# Patient Record
Sex: MALE | Race: WHITE | NOT HISPANIC OR LATINO | URBAN - METROPOLITAN AREA
[De-identification: names, ages, dates, MRNs, and addresses within clinical notes are randomized per-mention and may not be internally consistent; named-entity substitution may affect disease eponyms.]

---

## 2017-02-10 ENCOUNTER — EMERGENCY (EMERGENCY)
Facility: HOSPITAL | Age: 27
LOS: 1 days | Discharge: HOME | End: 2017-02-10
Attending: EMERGENCY MEDICINE | Admitting: EMERGENCY MEDICINE
Payer: MEDICAID

## 2017-02-10 VITALS
HEART RATE: 73 BPM | RESPIRATION RATE: 18 BRPM | WEIGHT: 179.9 LBS | HEIGHT: 68 IN | DIASTOLIC BLOOD PRESSURE: 80 MMHG | TEMPERATURE: 98 F | SYSTOLIC BLOOD PRESSURE: 114 MMHG | OXYGEN SATURATION: 99 %

## 2017-02-10 DIAGNOSIS — B02.30 ZOSTER OCULAR DISEASE, UNSPECIFIED: ICD-10-CM

## 2017-02-10 DIAGNOSIS — H53.141 VISUAL DISCOMFORT, RIGHT EYE: ICD-10-CM

## 2017-02-10 DIAGNOSIS — F17.200 NICOTINE DEPENDENCE, UNSPECIFIED, UNCOMPLICATED: ICD-10-CM

## 2017-02-10 DIAGNOSIS — Z88.0 ALLERGY STATUS TO PENICILLIN: ICD-10-CM

## 2017-02-10 PROCEDURE — 99283 EMERGENCY DEPT VISIT LOW MDM: CPT

## 2017-02-10 RX ORDER — VALACYCLOVIR 500 MG/1
1000 TABLET, FILM COATED ORAL ONCE
Qty: 0 | Refills: 0 | Status: COMPLETED | OUTPATIENT
Start: 2017-02-10 | End: 2017-02-10

## 2017-02-10 RX ADMIN — VALACYCLOVIR 1000 MILLIGRAM(S): 500 TABLET, FILM COATED ORAL at 23:25

## 2017-02-10 NOTE — ED PROVIDER NOTE - MEDICAL DECISION MAKING DETAILS
pt. with h/o HIV un known cd 4 count, non - compliant with meds, h/o zoster ophthalmicus rt eye , present in g with same complaints, no dendritic lesion on eye, + abrasion @ 6oclock, , no other rashes noted. will tx with valacyclovir, eye f.u in 24 hrs.

## 2017-02-10 NOTE — ED PROVIDER NOTE - EYES, MLM
rt ey e tearing, erythematous conjunctivae, Clear bilaterally, pupils equal, round and reactive to light. lt eye WNL>

## 2017-02-10 NOTE — ED PROVIDER NOTE - OBJECTIVE STATEMENT
pt, with h/o HIV cd count unknown non-compliant with meds, h/o Opthalmic zoster to rt eye 3 yrs ago. now presenting with same eye pain X 2 days. + tearing, pain 7/10, no vision changes, no fever/chills, no Ha, no other rash.

## 2017-02-11 VITALS
HEART RATE: 75 BPM | RESPIRATION RATE: 20 BRPM | DIASTOLIC BLOOD PRESSURE: 85 MMHG | OXYGEN SATURATION: 100 % | SYSTOLIC BLOOD PRESSURE: 110 MMHG

## 2017-02-11 RX ORDER — VALACYCLOVIR 500 MG/1
1 TABLET, FILM COATED ORAL
Qty: 20 | Refills: 0
Start: 2017-02-11 | End: 2017-02-21

## 2017-04-11 ENCOUNTER — EMERGENCY (EMERGENCY)
Facility: HOSPITAL | Age: 27
LOS: 1 days | Discharge: HOME | End: 2017-04-11
Admitting: EMERGENCY MEDICINE
Payer: MEDICAID

## 2017-04-11 VITALS
OXYGEN SATURATION: 100 % | RESPIRATION RATE: 20 BRPM | SYSTOLIC BLOOD PRESSURE: 133 MMHG | HEART RATE: 88 BPM | DIASTOLIC BLOOD PRESSURE: 86 MMHG | TEMPERATURE: 98 F

## 2017-04-11 DIAGNOSIS — L05.01 PILONIDAL CYST WITH ABSCESS: ICD-10-CM

## 2017-04-11 DIAGNOSIS — Z88.0 ALLERGY STATUS TO PENICILLIN: ICD-10-CM

## 2017-04-11 DIAGNOSIS — Z79.891 LONG TERM (CURRENT) USE OF OPIATE ANALGESIC: ICD-10-CM

## 2017-04-11 DIAGNOSIS — Z79.899 OTHER LONG TERM (CURRENT) DRUG THERAPY: ICD-10-CM

## 2017-04-11 PROCEDURE — 99283 EMERGENCY DEPT VISIT LOW MDM: CPT

## 2017-04-11 RX ORDER — AZTREONAM 2 G
2 VIAL (EA) INJECTION
Qty: 40 | Refills: 0
Start: 2017-04-11 | End: 2017-04-21

## 2017-04-11 RX ORDER — OXYCODONE HYDROCHLORIDE 5 MG/1
1 TABLET ORAL
Qty: 8 | Refills: 0
Start: 2017-04-11 | End: 2017-04-13

## 2017-04-11 NOTE — ED PROVIDER NOTE - SKIN, MLM
1.1cm pilonidal abscess with surrounding edema and erythema, purulent drainage. No induration, no fluctuance.

## 2017-04-11 NOTE — ED PROVIDER NOTE - OBJECTIVE STATEMENT
26y M Pt with PMHx of HIV, non-compliant with medications and does not know counts, no allergies, presents to ED with pain over the buttock for the last 4-5 days. Pt follows up with [facility], was seen there recently and prescribed Bactrim, which Pt has been taking one tablet of twice daily for the last 5 days. Pt presents with drainage from pilonidal abscess starting 2 days ago, and worsening pain. Denies fever, chills, and sweats.

## 2017-04-11 NOTE — ED PROVIDER NOTE - PROGRESS NOTE DETAILS
The scribe's documentation has been prepared under my direction and personally reviewed by me in its entirety. I confirm that the note above accurately reflects all work, treatment, procedures, and medical decision making performed by me.  MARIE Freedman

## 2017-04-11 NOTE — ED PROVIDER NOTE - MEDICAL DECISION MAKING DETAILS
Pt with drainage from pilonidal abscess. Sent Rx for more Bactrim and instructed Pt to take two tablets twice daily for the next 10 days. Also sent Rx for percocet.

## 2017-04-11 NOTE — ED PROVIDER NOTE - NS ED MD SCRIBE ATTENDING SCRIBE SECTIONS
HISTORY OF PRESENT ILLNESS/VITAL SIGNS( Pullset)/INTAKE ASSESSMENT/SCREENINGS/PHYSICAL EXAM/REVIEW OF SYSTEMS/HIV/PAST MEDICAL/SURGICAL/SOCIAL HISTORY

## 2018-03-19 ENCOUNTER — OUTPATIENT (OUTPATIENT)
Dept: OUTPATIENT SERVICES | Facility: HOSPITAL | Age: 28
LOS: 1 days | Discharge: HOME | End: 2018-03-19

## 2018-08-31 ENCOUNTER — EMERGENCY (EMERGENCY)
Facility: HOSPITAL | Age: 28
LOS: 1 days | Discharge: HOME | End: 2018-08-31
Attending: EMERGENCY MEDICINE | Admitting: EMERGENCY MEDICINE
Payer: MEDICAID

## 2018-08-31 VITALS
OXYGEN SATURATION: 100 % | RESPIRATION RATE: 20 BRPM | TEMPERATURE: 98 F | SYSTOLIC BLOOD PRESSURE: 163 MMHG | DIASTOLIC BLOOD PRESSURE: 98 MMHG | HEART RATE: 89 BPM

## 2018-08-31 VITALS
HEART RATE: 84 BPM | RESPIRATION RATE: 18 BRPM | SYSTOLIC BLOOD PRESSURE: 129 MMHG | DIASTOLIC BLOOD PRESSURE: 79 MMHG | OXYGEN SATURATION: 99 %

## 2018-08-31 DIAGNOSIS — Z79.891 LONG TERM (CURRENT) USE OF OPIATE ANALGESIC: ICD-10-CM

## 2018-08-31 DIAGNOSIS — R41.82 ALTERED MENTAL STATUS, UNSPECIFIED: ICD-10-CM

## 2018-08-31 DIAGNOSIS — B20 HUMAN IMMUNODEFICIENCY VIRUS [HIV] DISEASE: ICD-10-CM
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
## 2018-08-31 DIAGNOSIS — Z88.0 ALLERGY STATUS TO PENICILLIN: ICD-10-CM

## 2018-08-31 DIAGNOSIS — R10.9 UNSPECIFIED ABDOMINAL PAIN: ICD-10-CM

## 2018-08-31 DIAGNOSIS — F15.10 OTHER STIMULANT ABUSE, UNCOMPLICATED: ICD-10-CM

## 2018-08-31 DIAGNOSIS — Z79.899 OTHER LONG TERM (CURRENT) DRUG THERAPY: ICD-10-CM

## 2018-08-31 PROBLEM — B02.9 ZOSTER WITHOUT COMPLICATIONS: Chronic | Status: ACTIVE | Noted: 2017-02-10

## 2018-08-31 LAB
ALBUMIN SERPL ELPH-MCNC: 4.2 G/DL — SIGNIFICANT CHANGE UP (ref 3.4–5)
ALP SERPL-CCNC: 103 U/L — SIGNIFICANT CHANGE UP (ref 40–120)
ALT FLD-CCNC: 24 U/L — SIGNIFICANT CHANGE UP (ref 12–42)
ANION GAP SERPL CALC-SCNC: 10 MMOL/L — SIGNIFICANT CHANGE UP (ref 9–16)
APPEARANCE UR: CLEAR — SIGNIFICANT CHANGE UP
AST SERPL-CCNC: 18 U/L — SIGNIFICANT CHANGE UP (ref 15–37)
BASOPHILS NFR BLD AUTO: 0.3 % — SIGNIFICANT CHANGE UP (ref 0–2)
BILIRUB SERPL-MCNC: 0.4 MG/DL — SIGNIFICANT CHANGE UP (ref 0.2–1.2)
BILIRUB UR-MCNC: NEGATIVE — SIGNIFICANT CHANGE UP
BUN SERPL-MCNC: 12 MG/DL — SIGNIFICANT CHANGE UP (ref 7–23)
CALCIUM SERPL-MCNC: 9.2 MG/DL — SIGNIFICANT CHANGE UP (ref 8.5–10.5)
CHLORIDE SERPL-SCNC: 97 MMOL/L — SIGNIFICANT CHANGE UP (ref 96–108)
CK MB BLD-MCNC: 0.91 % — SIGNIFICANT CHANGE UP
CK MB CFR SERPL CALC: 1.3 NG/ML — SIGNIFICANT CHANGE UP (ref 0.5–3.6)
CK SERPL-CCNC: 143 U/L — SIGNIFICANT CHANGE UP (ref 39–308)
CO2 SERPL-SCNC: 27 MMOL/L — SIGNIFICANT CHANGE UP (ref 22–31)
COLOR SPEC: YELLOW — SIGNIFICANT CHANGE UP
CREAT SERPL-MCNC: 0.97 MG/DL — SIGNIFICANT CHANGE UP (ref 0.5–1.3)
DIFF PNL FLD: NEGATIVE — SIGNIFICANT CHANGE UP
EOSINOPHIL NFR BLD AUTO: 0.3 % — SIGNIFICANT CHANGE UP (ref 0–6)
ETHANOL SERPL-MCNC: <3 MG/DL — SIGNIFICANT CHANGE UP
GLUCOSE SERPL-MCNC: 145 MG/DL — HIGH (ref 70–99)
GLUCOSE UR QL: NEGATIVE — SIGNIFICANT CHANGE UP
HCT VFR BLD CALC: 42.3 % — SIGNIFICANT CHANGE UP (ref 39–50)
HGB BLD-MCNC: 15.2 G/DL — SIGNIFICANT CHANGE UP (ref 13–17)
IMM GRANULOCYTES NFR BLD AUTO: 0.4 % — SIGNIFICANT CHANGE UP (ref 0–1.5)
KETONES UR-MCNC: ABNORMAL MG/DL
LEUKOCYTE ESTERASE UR-ACNC: NEGATIVE — SIGNIFICANT CHANGE UP
LIDOCAIN IGE QN: 112 U/L — SIGNIFICANT CHANGE UP (ref 73–393)
LYMPHOCYTES # BLD AUTO: 47.6 % — HIGH (ref 13–44)
MCHC RBC-ENTMCNC: 30.6 PG — SIGNIFICANT CHANGE UP (ref 27–34)
MCHC RBC-ENTMCNC: 35.9 G/DL — SIGNIFICANT CHANGE UP (ref 32–36)
MCV RBC AUTO: 85.3 FL — SIGNIFICANT CHANGE UP (ref 80–100)
MONOCYTES NFR BLD AUTO: 6.8 % — SIGNIFICANT CHANGE UP (ref 2–14)
NEUTROPHILS NFR BLD AUTO: 44.6 % — SIGNIFICANT CHANGE UP (ref 43–77)
NITRITE UR-MCNC: NEGATIVE — SIGNIFICANT CHANGE UP
PCP SPEC-MCNC: SIGNIFICANT CHANGE UP
PH UR: 6 — SIGNIFICANT CHANGE UP (ref 5–8)
PLATELET # BLD AUTO: 333 K/UL — SIGNIFICANT CHANGE UP (ref 150–400)
POTASSIUM SERPL-MCNC: 3.2 MMOL/L — LOW (ref 3.5–5.3)
POTASSIUM SERPL-SCNC: 3.2 MMOL/L — LOW (ref 3.5–5.3)
PROT SERPL-MCNC: 9.4 G/DL — HIGH (ref 6.4–8.2)
PROT UR-MCNC: NEGATIVE MG/DL — SIGNIFICANT CHANGE UP
RBC # BLD: 4.96 M/UL — SIGNIFICANT CHANGE UP (ref 4.2–5.8)
RBC # FLD: 12.5 % — SIGNIFICANT CHANGE UP (ref 10.3–16.9)
SODIUM SERPL-SCNC: 134 MMOL/L — SIGNIFICANT CHANGE UP (ref 132–145)
SP GR SPEC: 1.02 — SIGNIFICANT CHANGE UP (ref 1–1.03)
TROPONIN I SERPL-MCNC: <0.017 NG/ML — LOW (ref 0.02–0.06)
UROBILINOGEN FLD QL: 0.2 E.U./DL — SIGNIFICANT CHANGE UP
WBC # BLD: 14.8 K/UL — HIGH (ref 3.8–10.5)
WBC # FLD AUTO: 14.8 K/UL — HIGH (ref 3.8–10.5)

## 2018-08-31 PROCEDURE — 74176 CT ABD & PELVIS W/O CONTRAST: CPT | Mod: 26

## 2018-08-31 PROCEDURE — 99284 EMERGENCY DEPT VISIT MOD MDM: CPT | Mod: 25

## 2018-08-31 RX ORDER — SODIUM CHLORIDE 9 MG/ML
1000 INJECTION, SOLUTION INTRAVENOUS
Qty: 0 | Refills: 0 | Status: DISCONTINUED | OUTPATIENT
Start: 2018-08-31 | End: 2018-09-04

## 2018-08-31 RX ORDER — SODIUM CHLORIDE 9 MG/ML
1000 INJECTION INTRAMUSCULAR; INTRAVENOUS; SUBCUTANEOUS ONCE
Qty: 0 | Refills: 0 | Status: COMPLETED | OUTPATIENT
Start: 2018-08-31 | End: 2018-08-31

## 2018-08-31 RX ADMIN — Medication 0.5 MILLIGRAM(S): at 09:18

## 2018-08-31 RX ADMIN — SODIUM CHLORIDE 1000 MILLILITER(S): 9 INJECTION INTRAMUSCULAR; INTRAVENOUS; SUBCUTANEOUS at 07:30

## 2018-08-31 RX ADMIN — SODIUM CHLORIDE 2000 MILLILITER(S): 9 INJECTION INTRAMUSCULAR; INTRAVENOUS; SUBCUTANEOUS at 07:01

## 2018-08-31 RX ADMIN — SODIUM CHLORIDE 1000 MILLILITER(S): 9 INJECTION, SOLUTION INTRAVENOUS at 09:18

## 2018-08-31 NOTE — ED PROVIDER NOTE - MEDICAL DECISION MAKING DETAILS
Will check labs and U tox.  Will check CTAP to r/o acute pathology given severity of pain. Will sign out to day team at 8am.

## 2018-08-31 NOTE — ED PROVIDER NOTE - CARE PLAN
Principal Discharge DX:	Altered mental status  Secondary Diagnosis:	HIV (human immunodeficiency virus infection)  Secondary Diagnosis:	Amphetamine abuse

## 2018-08-31 NOTE — ED PROVIDER NOTE - PHYSICAL EXAMINATION
VITAL SIGNS: I have reviewed nursing notes and confirm.  CONSTITUTIONAL: Writhing around in stretcher and hyperventilating.  Calms down with verbal techniques and cooperates with w/u but still unwilling to give full hx.   SKIN: Skin is warm and dry, no acute rash.  HEAD: Normocephalic; atraumatic.  EYES: PERRL, EOM intact; conjunctiva and sclera clear.  ENT: No nasal discharge; airway clear.  NECK: Supple; non tender.  CARD: S1, S2 normal; no murmurs, gallops, or rubs. Regular rate and rhythm.  RESP: No wheezes, rales or rhonchi.  ABD: Normal bowel sounds; soft; non-distended; non-tender; no hepatosplenomegaly.  EXT: Normal ROM. No clubbing, cyanosis or edema.  NEURO: Alert, oriented. Grossly unremarkable.  PSYCH: Cooperative, appropriate.

## 2018-08-31 NOTE — ED ADULT NURSE REASSESSMENT NOTE - NS ED NURSE REASSESS COMMENT FT1
pt is now agitated and hyperventilating. pt given NRB without O2 to help with breathing and instructed on how to breathe.
RECEIVED PT FROM NIGHT SHIFT RN. PT IS IN NAD, RESTING COMFORTABLY, AND WILL CONTINUE TO MONITOR.

## 2018-08-31 NOTE — ED ADULT NURSE NOTE - NSIMPLEMENTINTERV_GEN_ALL_ED
Implemented All Universal Safety Interventions:  [place] to call system. Call bell, personal items and telephone within reach. Instruct patient to call for assistance. Room bathroom lighting operational. Non-slip footwear when patient is off stretcher. Physically safe environment: no spills, clutter or unnecessary equipment. Stretcher in lowest position, wheels locked, appropriate side rails in place.
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
## 2018-08-31 NOTE — ED PROVIDER NOTE - OBJECTIVE STATEMENT
Pt is a 29yo M who reports sudden onset of severe abd pain.  Reports sharp and stabbing.  Pt is extremely anxious and unwilling to give past hx or setting under which sxs occurred.  Security noted pt was lying on floor outside of ED and then came in to be triaged.    PMH - HIV - unknown last CD4/VL.

## 2018-08-31 NOTE — ED ADULT TRIAGE NOTE - CHIEF COMPLAINT QUOTE
pt ambulatory, here for sudden onset abdominal and back pain. Pt refused to give further info. Grimacing at arrival.

## 2018-09-01 ENCOUNTER — EMERGENCY (EMERGENCY)
Facility: HOSPITAL | Age: 28
LOS: 1 days | Discharge: HOME | End: 2018-09-01
Admitting: EMERGENCY MEDICINE
Payer: MEDICAID

## 2018-09-01 VITALS
OXYGEN SATURATION: 99 % | TEMPERATURE: 99 F | SYSTOLIC BLOOD PRESSURE: 140 MMHG | DIASTOLIC BLOOD PRESSURE: 92 MMHG | HEART RATE: 128 BPM | RESPIRATION RATE: 18 BRPM

## 2018-09-01 DIAGNOSIS — Z79.2 LONG TERM (CURRENT) USE OF ANTIBIOTICS: ICD-10-CM

## 2018-09-01 DIAGNOSIS — R21 RASH AND OTHER NONSPECIFIC SKIN ERUPTION: ICD-10-CM

## 2018-09-01 DIAGNOSIS — R53.1 WEAKNESS: ICD-10-CM

## 2018-09-01 DIAGNOSIS — Z79.891 LONG TERM (CURRENT) USE OF OPIATE ANALGESIC: ICD-10-CM

## 2018-09-01 DIAGNOSIS — R51 HEADACHE: ICD-10-CM

## 2018-09-01 DIAGNOSIS — B20 HUMAN IMMUNODEFICIENCY VIRUS [HIV] DISEASE: ICD-10-CM

## 2018-09-01 DIAGNOSIS — Z79.899 OTHER LONG TERM (CURRENT) DRUG THERAPY: ICD-10-CM

## 2018-09-01 DIAGNOSIS — R10.9 UNSPECIFIED ABDOMINAL PAIN: ICD-10-CM

## 2018-09-01 DIAGNOSIS — F17.200 NICOTINE DEPENDENCE, UNSPECIFIED, UNCOMPLICATED: ICD-10-CM

## 2018-09-01 DIAGNOSIS — Z88.0 ALLERGY STATUS TO PENICILLIN: ICD-10-CM

## 2018-09-01 DIAGNOSIS — R50.9 FEVER, UNSPECIFIED: ICD-10-CM

## 2018-09-01 PROCEDURE — 93010 ELECTROCARDIOGRAM REPORT: CPT | Mod: XU

## 2018-09-01 PROCEDURE — 62270 DX LMBR SPI PNXR: CPT

## 2018-09-01 PROCEDURE — 99285 EMERGENCY DEPT VISIT HI MDM: CPT | Mod: 25

## 2018-09-01 RX ORDER — MIDAZOLAM HYDROCHLORIDE 1 MG/ML
1 INJECTION, SOLUTION INTRAMUSCULAR; INTRAVENOUS ONCE
Qty: 0 | Refills: 0 | Status: DISCONTINUED | OUTPATIENT
Start: 2018-09-01 | End: 2018-09-01

## 2018-09-01 RX ORDER — AZITHROMYCIN 500 MG/1
1000 TABLET, FILM COATED ORAL ONCE
Qty: 0 | Refills: 0 | Status: COMPLETED | OUTPATIENT
Start: 2018-09-01 | End: 2018-09-01

## 2018-09-01 RX ORDER — SODIUM CHLORIDE 9 MG/ML
1000 INJECTION INTRAMUSCULAR; INTRAVENOUS; SUBCUTANEOUS ONCE
Qty: 0 | Refills: 0 | Status: COMPLETED | OUTPATIENT
Start: 2018-09-01 | End: 2018-09-01

## 2018-09-01 RX ORDER — CEFTRIAXONE 500 MG/1
0.25 INJECTION, POWDER, FOR SOLUTION INTRAMUSCULAR; INTRAVENOUS ONCE
Qty: 0 | Refills: 0 | Status: COMPLETED | OUTPATIENT
Start: 2018-09-01 | End: 2018-09-01

## 2018-09-01 NOTE — ED PROVIDER NOTE - PMH
HIV (human immunodeficiency virus infection)    Shingles HIV (human immunodeficiency virus infection)    Shingles    Syphilis

## 2018-09-01 NOTE — ED PROVIDER NOTE - PHYSICAL EXAMINATION
Vital Signs - nursing notes reviewed and confirmed  Gen - WDWN, NAD, comfortable and non-toxic appearing, speaking in full sentences   Skin - warm, dry,   HEENT - AT/NC, PERRL, EOMI, no conjunctival injection, moist oral mucosa, o/p clear with no erythema, edema, or exudate, uvula midline, airway patent, neck supple and NT, FROM, no JVD or carotid bruits b/l, no palpable nodes  CV - S1S2, R/R/R  Resp - respiration non-labored, CTAB, symmetric bs b/l, no r/r/w  GI - NABS, soft, ND, NT, no rebound or guarding, no CVAT b/l   MS - w/w/p, no c/c/e, calves supple and NT, distal pulses symmetric b/l, brisk cap refills   Neuro - AxOx3, no focal neuro deficits, CN II-XII grossly intact, cerebellar function intact, negative pronator drift, negative nystagmus, ambulatory without gait disturbance Vital Signs - nursing notes reviewed and confirmed  Gen - WDWN M, NAD, comfortable and non-toxic appearing, speaking in full sentences   Skin - warm, dry,   HEENT - AT/NC, PERRL, EOMI, no conjunctival injection, moist oral mucosa, o/p clear with no erythema, edema, or exudate, uvula midline, airway patent, neck supple and NT, FROM, no JVD or carotid bruits b/l, no palpable nodes  CV - S1S2, R/R/R  Resp - respiration non-labored, CTAB, symmetric bs b/l, no r/r/w  GI - NABS, soft, ND, NT, no rebound or guarding, no CVAT b/l   MS - w/w/p, no c/c/e, calves supple and NT, distal pulses symmetric b/l, brisk cap refills   Neuro - AxOx3, no focal neuro deficits, CN II-XII grossly intact, cerebellar function intact, negative pronator drift, negative nystagmus, ambulatory without gait disturbance Vital Signs - nursing notes reviewed and confirmed  Gen - WDWN M, NAD, comfortable and non-toxic appearing, speaking in full sentences   Skin - warm, dry, NT, balanachable maculopapular rash to the back, and b/l dorsum feet, not involving the palms/soles/oral mucosa, no desquamation of skin, no crepitus, warmth, ecchymosis, streaking or fluctuance noted   HEENT - AT/NC, PERRL, EOMI, no conjunctival injection, moist oral mucosa, o/p clear with no erythema, edema, or exudate, uvula midline, airway patent, neck supple and NT, FROM, no JVD or carotid bruits b/l, no palpable nodes  CV - S1S2, R/R/R  Resp - respiration non-labored, CTAB, symmetric bs b/l, no r/r/w  GI - NABS, soft, ND, NT, no rebound or guarding, no CVAT b/l   MS - w/w/p, no c/c/e, calves supple and NT, distal pulses symmetric b/l, brisk cap refills   Neuro - AxOx3, no focal neuro deficits, CN II-XII grossly intact, cerebellar function intact, negative pronator drift, negative nystagmus, ambulatory without gait disturbance

## 2018-09-01 NOTE — ED PROVIDER NOTE - MEDICAL DECISION MAKING DETAILS
AFVSS at time of d/c, pt non-toxic appearing, results, ddx, and f/u plans discussed with pt at bedside, d/c'd home to f/u with PMD, strict return precautions discussed, prompt return to ER for any worsening or new sx, pt verbalized understanding. pt with generalized malaise, subjective fever/chills and HA, neck pain x few days, seen here yesterday and s/p labs and CT A/P with unremarkable findings, h/o STI in the past, now with new lesions to the back and b/l feet but sparing palms/soles/oral mucosa, repeat labs unremarkable except for mild hypokalemia, s/p STD and ticke borne dz panel, empirically tx with abx for syphilis/GC/CG, LP performed, unremarkable, gram stain neg, VDRL/syphilis/lyme PCR obtained and sent, pt reports feeling better s/p IVF and supportive care, AFVSS and non-toxic appearing, results, ddx, and f/u plans discussed with pt at bedside, d/c'd home to f/u with PMD and ID, strict return precautions discussed, prompt return to ER for any worsening or new sx, pt verbalized understanding.

## 2018-09-01 NOTE — ED ADULT NURSE NOTE - NSIMPLEMENTINTERV_GEN_ALL_ED
Implemented All Universal Safety Interventions:  [place] to call system. Call bell, personal items and telephone within reach. Instruct patient to call for assistance. Room bathroom lighting operational. Non-slip footwear when patient is off stretcher. Physically safe environment: no spills, clutter or unnecessary equipment. Stretcher in lowest position, wheels locked, appropriate side rails in place.

## 2018-09-01 NOTE — ED ADULT TRIAGE NOTE - CHIEF COMPLAINT QUOTE
Pt states he has been "feeling sick" for the pas 5 days. Describing multiple vague symptoms and generalized weakness. Lethargic and tachycardic in triage. Denies drug use. Pt's friend states "pt has latent syphilis".  Was seen in ED 2 days ago for same complaint.  PMH HIV.

## 2018-09-01 NOTE — ED PROVIDER NOTE - OBJECTIVE STATEMENT
29 yo M with PMHx of HIV, last CD4/VL unknown, on HAART, syphilis in 2014 and treated, seen here 1d ago for similar sx, 29 yo M with PMHx of HIV, last CD4/VL unknown, on HAART, syphilis in 2014 and treated, and shingles, seen here 1d ago for similar sx, presenting today c/o worsening fatigue, generalized weakness, subjective fever, chills, HA, and rash. Denies SOB, CP, palpitations, wheezing, stridors, tongue/lip swelling, focal weakness, dizziness, N/V/D/C, oral/genital lesions, cough, paresthesia, numbness, tingling, malaise, and diaphoresis. 27 yo M with PMHx of HIV, last CD4/VL unknown, on HAART, syphilis in 2014 and treated, and shingles, seen here 1d ago for similar sx, presenting today c/o worsening fatigue, generalized weakness, subjective fever, chills, HA, and rash. Pt reports feeling unwell for the past 5d with generalized malaise, HA, neck and back pain.  Was having nausea and abdominal pain two days ago, seen here s/p labs and CT with slightly elevated wbc to 14 but no acute findings on CT.  Noted ?lesion/rash to the back and feet few days ago.  Denies SOB, CP, palpitations, wheezing, stridors, tongue/lip swelling, focal weakness, dizziness, V/D/C, oral/genital lesions, cough, paresthesia, numbness, tingling, malaise, and diaphoresis. Pt is sexually active with single male partner, last intercourse one wk ago with no protection noted.

## 2018-09-01 NOTE — ED ADULT NURSE NOTE - OBJECTIVE STATEMENT
pt aox4 presenting with generalized pain and weakness. most pain in neck and back. pt states he is having back spasms. pt otherwise neurologically wnl. no sob or difficulty breathing noted. lung sounds clear bilaterally. skin appropriate for ethnicity, warm and dry. rash noted to back. pulses 2+ and regular. cap refill < 2 secs. sinus tach noted to tele with hr 110. abd rounded soft and nontender.

## 2018-09-02 VITALS
HEART RATE: 87 BPM | RESPIRATION RATE: 18 BRPM | SYSTOLIC BLOOD PRESSURE: 119 MMHG | OXYGEN SATURATION: 97 % | TEMPERATURE: 97 F | DIASTOLIC BLOOD PRESSURE: 63 MMHG

## 2018-09-02 LAB
ALBUMIN SERPL ELPH-MCNC: 3.8 G/DL — SIGNIFICANT CHANGE UP (ref 3.4–5)
ALP SERPL-CCNC: 97 U/L — SIGNIFICANT CHANGE UP (ref 40–120)
ALT FLD-CCNC: 22 U/L — SIGNIFICANT CHANGE UP (ref 12–42)
ANION GAP SERPL CALC-SCNC: 7 MMOL/L — LOW (ref 9–16)
APPEARANCE CSF: CLEAR — SIGNIFICANT CHANGE UP
APPEARANCE SPUN FLD: COLORLESS — SIGNIFICANT CHANGE UP
AST SERPL-CCNC: 17 U/L — SIGNIFICANT CHANGE UP (ref 15–37)
BACTERIAL AG PNL SER: 0 % — SIGNIFICANT CHANGE UP
BASOPHILS NFR BLD AUTO: 0.6 % — SIGNIFICANT CHANGE UP (ref 0–2)
BILIRUB SERPL-MCNC: 0.2 MG/DL — SIGNIFICANT CHANGE UP (ref 0.2–1.2)
BUN SERPL-MCNC: 4 MG/DL — LOW (ref 7–23)
CALCIUM SERPL-MCNC: 9.1 MG/DL — SIGNIFICANT CHANGE UP (ref 8.5–10.5)
CHLORIDE SERPL-SCNC: 101 MMOL/L — SIGNIFICANT CHANGE UP (ref 96–108)
CO2 SERPL-SCNC: 29 MMOL/L — SIGNIFICANT CHANGE UP (ref 22–31)
COLOR CSF: COLORLESS — SIGNIFICANT CHANGE UP
CREAT SERPL-MCNC: 0.74 MG/DL — SIGNIFICANT CHANGE UP (ref 0.5–1.3)
CRYPTOC AG CSF-ACNC: NEGATIVE — SIGNIFICANT CHANGE UP
CSF COMMENTS: SIGNIFICANT CHANGE UP
CSF PCR RESULT: SIGNIFICANT CHANGE UP
EOSINOPHIL # CSF: 0 % — SIGNIFICANT CHANGE UP
EOSINOPHIL NFR BLD AUTO: 1.4 % — SIGNIFICANT CHANGE UP (ref 0–6)
ETHANOL SERPL-MCNC: <3 MG/DL — SIGNIFICANT CHANGE UP
GLUCOSE CSF-MCNC: 74 MG/DL — HIGH (ref 40–70)
GLUCOSE SERPL-MCNC: 117 MG/DL — HIGH (ref 70–99)
GRAM STN FLD: SIGNIFICANT CHANGE UP
HCT VFR BLD CALC: 44.5 % — SIGNIFICANT CHANGE UP (ref 39–50)
HGB BLD-MCNC: 15.3 G/DL — SIGNIFICANT CHANGE UP (ref 13–17)
IMM GRANULOCYTES NFR BLD AUTO: 1.4 % — SIGNIFICANT CHANGE UP (ref 0–1.5)
LACTATE SERPL-SCNC: 1.2 MMOL/L — SIGNIFICANT CHANGE UP (ref 0.4–2)
LYMPHOCYTES # BLD AUTO: 33.3 % — SIGNIFICANT CHANGE UP (ref 13–44)
LYMPHOCYTES # CSF: 0 % — LOW (ref 40–80)
MAGNESIUM SERPL-MCNC: 2.1 MG/DL — SIGNIFICANT CHANGE UP (ref 1.6–2.6)
MCHC RBC-ENTMCNC: 29.9 PG — SIGNIFICANT CHANGE UP (ref 27–34)
MCHC RBC-ENTMCNC: 34.4 G/DL — SIGNIFICANT CHANGE UP (ref 32–36)
MCV RBC AUTO: 87.1 FL — SIGNIFICANT CHANGE UP (ref 80–100)
MONOCYTES NFR BLD AUTO: 10.3 % — SIGNIFICANT CHANGE UP (ref 2–14)
MONOS+MACROS NFR CSF: 0 % — LOW (ref 15–45)
NEUTROPHILS # CSF: 0 % — SIGNIFICANT CHANGE UP (ref 0–6)
NEUTROPHILS NFR BLD AUTO: 53 % — SIGNIFICANT CHANGE UP (ref 43–77)
NRBC NFR CSF: 0 % — SIGNIFICANT CHANGE UP (ref 0–0)
NRBC NFR CSF: 0 /UL — SIGNIFICANT CHANGE UP
OTHER CELLS CSF MANUAL: 0 % — SIGNIFICANT CHANGE UP
PCP SPEC-MCNC: SIGNIFICANT CHANGE UP
PLATELET # BLD AUTO: 318 K/UL — SIGNIFICANT CHANGE UP (ref 150–400)
POTASSIUM SERPL-MCNC: 3.2 MMOL/L — LOW (ref 3.5–5.3)
POTASSIUM SERPL-SCNC: 3.2 MMOL/L — LOW (ref 3.5–5.3)
PROT CSF-MCNC: 36 MG/DL — SIGNIFICANT CHANGE UP (ref 15–45)
PROT SERPL-MCNC: 8.8 G/DL — HIGH (ref 6.4–8.2)
RBC # BLD: 5.11 M/UL — SIGNIFICANT CHANGE UP (ref 4.2–5.8)
RBC # CSF: 0 /UL — SIGNIFICANT CHANGE UP (ref 0–0)
RBC # FLD: 12.5 % — SIGNIFICANT CHANGE UP (ref 10.3–16.9)
SODIUM SERPL-SCNC: 137 MMOL/L — SIGNIFICANT CHANGE UP (ref 132–145)
SPECIMEN SOURCE: SIGNIFICANT CHANGE UP
TUBE TYPE: SIGNIFICANT CHANGE UP
WBC # BLD: 6.4 K/UL — SIGNIFICANT CHANGE UP (ref 3.8–10.5)
WBC # FLD AUTO: 6.4 K/UL — SIGNIFICANT CHANGE UP (ref 3.8–10.5)

## 2018-09-02 PROCEDURE — 70450 CT HEAD/BRAIN W/O DYE: CPT | Mod: 26

## 2018-09-02 RX ORDER — POTASSIUM CHLORIDE 20 MEQ
40 PACKET (EA) ORAL ONCE
Qty: 0 | Refills: 0 | Status: COMPLETED | OUTPATIENT
Start: 2018-09-02 | End: 2018-09-02

## 2018-09-02 RX ORDER — SODIUM CHLORIDE 9 MG/ML
1000 INJECTION INTRAMUSCULAR; INTRAVENOUS; SUBCUTANEOUS ONCE
Qty: 0 | Refills: 0 | Status: COMPLETED | OUTPATIENT
Start: 2018-09-02 | End: 2018-09-02

## 2018-09-02 RX ADMIN — MIDAZOLAM HYDROCHLORIDE 1 MILLIGRAM(S): 1 INJECTION, SOLUTION INTRAMUSCULAR; INTRAVENOUS at 01:01

## 2018-09-02 RX ADMIN — AZITHROMYCIN 1000 MILLIGRAM(S): 500 TABLET, FILM COATED ORAL at 00:32

## 2018-09-02 RX ADMIN — Medication 40 MILLIEQUIVALENT(S): at 03:47

## 2018-09-02 RX ADMIN — SODIUM CHLORIDE 2000 MILLILITER(S): 9 INJECTION INTRAMUSCULAR; INTRAVENOUS; SUBCUTANEOUS at 00:33

## 2018-09-02 RX ADMIN — SODIUM CHLORIDE 2000 MILLILITER(S): 9 INJECTION INTRAMUSCULAR; INTRAVENOUS; SUBCUTANEOUS at 01:25

## 2018-09-02 RX ADMIN — CEFTRIAXONE 100 GRAM(S): 500 INJECTION, POWDER, FOR SOLUTION INTRAMUSCULAR; INTRAVENOUS at 00:32

## 2018-09-02 RX ADMIN — Medication 100 MILLIGRAM(S): at 00:32

## 2018-09-02 NOTE — ED PROCEDURE NOTE - PROCEDURE ADDITIONAL DETAILS
pt placed in supine position post LP with IVF, no active bleeding or HA noted, neurologically intact post LP

## 2018-09-03 LAB
C TRACH RRNA SPEC QL NAA+PROBE: SIGNIFICANT CHANGE UP
LYME C6 AB IGG/IGM EIA REFLEX WESTERN BL: SIGNIFICANT CHANGE UP
N GONORRHOEA RRNA SPEC QL NAA+PROBE: SIGNIFICANT CHANGE UP
SPECIMEN SOURCE: SIGNIFICANT CHANGE UP

## 2018-09-04 ENCOUNTER — EMERGENCY (EMERGENCY)
Facility: HOSPITAL | Age: 28
LOS: 1 days | Discharge: HOME | End: 2018-09-04
Attending: EMERGENCY MEDICINE | Admitting: EMERGENCY MEDICINE
Payer: MEDICAID

## 2018-09-04 VITALS
DIASTOLIC BLOOD PRESSURE: 72 MMHG | RESPIRATION RATE: 17 BRPM | TEMPERATURE: 99 F | SYSTOLIC BLOOD PRESSURE: 170 MMHG | OXYGEN SATURATION: 100 % | HEART RATE: 104 BPM

## 2018-09-04 VITALS
TEMPERATURE: 98 F | DIASTOLIC BLOOD PRESSURE: 102 MMHG | RESPIRATION RATE: 18 BRPM | SYSTOLIC BLOOD PRESSURE: 155 MMHG | OXYGEN SATURATION: 99 % | HEART RATE: 120 BPM

## 2018-09-04 PROBLEM — A53.9 SYPHILIS, UNSPECIFIED: Chronic | Status: ACTIVE | Noted: 2018-09-02

## 2018-09-04 LAB
ANION GAP SERPL CALC-SCNC: 4 MMOL/L — LOW (ref 9–16)
B MICROTI DNA BLD QL NAA+PROBE: NEGATIVE — SIGNIFICANT CHANGE UP
BABESIA DNA SPEC QL NAA+PROBE: NEGATIVE — SIGNIFICANT CHANGE UP
BABESIA DNA SPEC QL NAA+PROBE: NEGATIVE — SIGNIFICANT CHANGE UP
BASOPHILS NFR BLD AUTO: 0.3 % — SIGNIFICANT CHANGE UP (ref 0–2)
BUN SERPL-MCNC: 5 MG/DL — LOW (ref 7–23)
CALCIUM SERPL-MCNC: 9.2 MG/DL — SIGNIFICANT CHANGE UP (ref 8.5–10.5)
CHLORIDE SERPL-SCNC: 101 MMOL/L — SIGNIFICANT CHANGE UP (ref 96–108)
CO2 SERPL-SCNC: 33 MMOL/L — HIGH (ref 22–31)
CREAT SERPL-MCNC: 0.82 MG/DL — SIGNIFICANT CHANGE UP (ref 0.5–1.3)
CRP SERPL-MCNC: 0.4 MG/DL — SIGNIFICANT CHANGE UP (ref 0–0.9)
EOSINOPHIL NFR BLD AUTO: 0.6 % — SIGNIFICANT CHANGE UP (ref 0–6)
GLUCOSE SERPL-MCNC: 111 MG/DL — HIGH (ref 70–99)
HCT VFR BLD CALC: 44.5 % — SIGNIFICANT CHANGE UP (ref 39–50)
HGB BLD-MCNC: 15.2 G/DL — SIGNIFICANT CHANGE UP (ref 13–17)
IMM GRANULOCYTES NFR BLD AUTO: 0.7 % — SIGNIFICANT CHANGE UP (ref 0–1.5)
LYMPHOCYTES # BLD AUTO: 21.1 % — SIGNIFICANT CHANGE UP (ref 13–44)
MCHC RBC-ENTMCNC: 30.3 PG — SIGNIFICANT CHANGE UP (ref 27–34)
MCHC RBC-ENTMCNC: 34.2 G/DL — SIGNIFICANT CHANGE UP (ref 32–36)
MCV RBC AUTO: 88.6 FL — SIGNIFICANT CHANGE UP (ref 80–100)
MONOCYTES NFR BLD AUTO: 7.2 % — SIGNIFICANT CHANGE UP (ref 2–14)
NEUTROPHILS NFR BLD AUTO: 70.1 % — SIGNIFICANT CHANGE UP (ref 43–77)
PLATELET # BLD AUTO: 291 K/UL — SIGNIFICANT CHANGE UP (ref 150–400)
POTASSIUM SERPL-MCNC: 3.9 MMOL/L — SIGNIFICANT CHANGE UP (ref 3.5–5.3)
POTASSIUM SERPL-SCNC: 3.9 MMOL/L — SIGNIFICANT CHANGE UP (ref 3.5–5.3)
RBC # BLD: 5.02 M/UL — SIGNIFICANT CHANGE UP (ref 4.2–5.8)
RBC # FLD: 12.5 % — SIGNIFICANT CHANGE UP (ref 10.3–16.9)
SODIUM SERPL-SCNC: 138 MMOL/L — SIGNIFICANT CHANGE UP (ref 132–145)
T PALLIDUM AB TITR SER: POSITIVE
WBC # BLD: 8.9 K/UL — SIGNIFICANT CHANGE UP (ref 3.8–10.5)
WBC # FLD AUTO: 8.9 K/UL — SIGNIFICANT CHANGE UP (ref 3.8–10.5)

## 2018-09-04 PROCEDURE — 99284 EMERGENCY DEPT VISIT MOD MDM: CPT | Mod: 25

## 2018-09-04 PROCEDURE — 93010 ELECTROCARDIOGRAM REPORT: CPT

## 2018-09-04 RX ORDER — DEXAMETHASONE 0.5 MG/5ML
6 ELIXIR ORAL ONCE
Qty: 0 | Refills: 0 | Status: COMPLETED | OUTPATIENT
Start: 2018-09-04 | End: 2018-09-04

## 2018-09-04 RX ADMIN — Medication 6 MILLIGRAM(S): at 17:48

## 2018-09-04 NOTE — ED ADULT TRIAGE NOTE - CHIEF COMPLAINT QUOTE
Patient to ED with complaint of head pain, neck pain, numbness in extremeties.  Patient seen in 4 ED's in past 3 days with normal results.  Patient stammering and anxious in triage.  Tachycardic.

## 2018-09-04 NOTE — ED ADULT NURSE NOTE - NSIMPLEMENTINTERV_GEN_ALL_ED
Implemented All Universal Safety Interventions:  [place] to call system. Call bell, personal items and telephone within reach. Instruct patient to call for assistance. Room bathroom lighting operational. Non-slip footwear when patient is off stretcher. Physically safe environment: no spills, clutter or unnecessary equipment. Stretcher in lowest position, wheels locked, appropriate side rails in place.

## 2018-09-04 NOTE — ED PROVIDER NOTE - OBJECTIVE STATEMENT
27 yo M with PMHx of HIV, last CD4/VL unknown, on HAART, syphilis in 2014 and treated, and shingles, seen here 1d ago for similar sx, presenting today c/o worsening fatigue, generalized weakness. Pt had LP on Sept 2nd, negative for cells/bacteria. Pt now c/o generalized muscle pain. + crystal meth use daily. Asking for food when I enter room. Denies new rash, fever/chills.

## 2018-09-05 ENCOUNTER — LABORATORY RESULT (OUTPATIENT)
Age: 28
End: 2018-09-05

## 2018-09-05 ENCOUNTER — APPOINTMENT (OUTPATIENT)
Dept: INTERNAL MEDICINE | Facility: CLINIC | Age: 28
End: 2018-09-05
Payer: COMMERCIAL

## 2018-09-05 VITALS
DIASTOLIC BLOOD PRESSURE: 94 MMHG | WEIGHT: 170 LBS | HEIGHT: 70 IN | SYSTOLIC BLOOD PRESSURE: 139 MMHG | BODY MASS INDEX: 24.34 KG/M2 | HEART RATE: 115 BPM | TEMPERATURE: 97.6 F | OXYGEN SATURATION: 97 %

## 2018-09-05 DIAGNOSIS — A53.9 SYPHILIS, UNSPECIFIED: ICD-10-CM

## 2018-09-05 DIAGNOSIS — F31.81 BIPOLAR II DISORDER: ICD-10-CM

## 2018-09-05 DIAGNOSIS — M54.12 RADICULOPATHY, CERVICAL REGION: ICD-10-CM

## 2018-09-05 DIAGNOSIS — B20 HUMAN IMMUNODEFICIENCY VIRUS [HIV] DISEASE: ICD-10-CM

## 2018-09-05 DIAGNOSIS — F17.200 NICOTINE DEPENDENCE, UNSPECIFIED, UNCOMPLICATED: ICD-10-CM

## 2018-09-05 DIAGNOSIS — F15.10 OTHER STIMULANT ABUSE, UNCOMPLICATED: ICD-10-CM

## 2018-09-05 PROBLEM — Z00.00 ENCOUNTER FOR PREVENTIVE HEALTH EXAMINATION: Status: ACTIVE | Noted: 2018-09-05

## 2018-09-05 LAB
B BURGDOR DNA SPEC QL NAA+PROBE: NEGATIVE — SIGNIFICANT CHANGE UP
CULTURE RESULTS: SIGNIFICANT CHANGE UP
SPECIMEN SOURCE: SIGNIFICANT CHANGE UP
VDRL CSF-TITR: NEGATIVE — SIGNIFICANT CHANGE UP

## 2018-09-05 PROCEDURE — 36415 COLL VENOUS BLD VENIPUNCTURE: CPT

## 2018-09-05 PROCEDURE — 99204 OFFICE O/P NEW MOD 45 MIN: CPT | Mod: 25

## 2018-09-05 RX ORDER — METHYLPREDNISOLONE 4 MG/1
4 TABLET ORAL
Qty: 1 | Refills: 1 | Status: ACTIVE | COMMUNITY
Start: 2018-09-05 | End: 1900-01-01

## 2018-09-05 RX ORDER — SACCHAROMYCES BOULARDII 250 MG
1 POWDER IN PACKET (EA) ORAL
Qty: 30 | Refills: 0
Start: 2018-09-05 | End: 2018-10-04

## 2018-09-05 RX ORDER — BICTEGRAVIR SODIUM, EMTRICITABINE, AND TENOFOVIR ALAFENAMIDE FUMARATE 50; 200; 25 MG/1; MG/1; MG/1
50-200-25 TABLET ORAL DAILY
Qty: 90 | Refills: 3 | Status: ACTIVE | COMMUNITY
Start: 2018-09-05 | End: 1900-01-01

## 2018-09-05 NOTE — ED POST DISCHARGE NOTE - DETAILS
Patient advised to come in for a dose of IV CTX if he is still feeling ill (overall is much better). Doxy Rx changed to 28d. He has not filled Rx yet. poss JH rxn, poss secondary syphilis.

## 2018-09-06 LAB
ALBUMIN SERPL ELPH-MCNC: 4.9 G/DL
ALP BLD-CCNC: 86 U/L
ALT SERPL-CCNC: 24 U/L
ANION GAP SERPL CALC-SCNC: 17 MMOL/L
APPEARANCE: CLEAR
AST SERPL-CCNC: 20 U/L
BASOPHILS # BLD AUTO: 0.02 K/UL
BASOPHILS NFR BLD AUTO: 0.1 %
BILIRUB SERPL-MCNC: 0.4 MG/DL
BILIRUBIN URINE: NEGATIVE
BLOOD URINE: NEGATIVE
BUN SERPL-MCNC: 17 MG/DL
C TRACH RRNA SPEC QL NAA+PROBE: NOT DETECTED
CALCIUM SERPL-MCNC: 9.8 MG/DL
CD3 CELLS # BLD: 2699 /UL
CD3 CELLS NFR BLD: 73 %
CD3+CD4+ CELLS # BLD: 691 /UL
CD3+CD4+ CELLS NFR BLD: 19 %
CD3+CD4+ CELLS/CD3+CD8+ CLL SPEC: 0.35 RATIO
CD3+CD8+ CELLS # SPEC: 1957 /UL
CD3+CD8+ CELLS NFR BLD: 53 %
CHLORIDE SERPL-SCNC: 98 MMOL/L
CHOLEST SERPL-MCNC: 141 MG/DL
CHOLEST/HDLC SERPL: 3.2 RATIO
CO2 SERPL-SCNC: 26 MMOL/L
COLOR: YELLOW
CREAT SERPL-MCNC: 0.76 MG/DL
EOSINOPHIL # BLD AUTO: 0.06 K/UL
EOSINOPHIL NFR BLD AUTO: 0.4 %
GLUCOSE QUALITATIVE U: NEGATIVE MG/DL
GLUCOSE SERPL-MCNC: 100 MG/DL
HCT VFR BLD CALC: 44.3 %
HDLC SERPL-MCNC: 44 MG/DL
HGB BLD-MCNC: 14.1 G/DL
HIV1 RNA # SERPL NAA+PROBE: ABNORMAL
HIV1 RNA # SERPL NAA+PROBE: NORMAL
HSV 1+2 IGG SER IA-IMP: POSITIVE
HSV 1+2 IGG SER IA-IMP: POSITIVE
HSV1 IGG SER QL: 5.7 INDEX
HSV2 IGG SER QL: 10.1 INDEX
IMM GRANULOCYTES NFR BLD AUTO: 0.4 %
KETONES URINE: ABNORMAL
LDLC SERPL CALC-MCNC: 81 MG/DL
LEUKOCYTE ESTERASE URINE: NEGATIVE
LYMPHOCYTES # BLD AUTO: 3.74 K/UL
LYMPHOCYTES NFR BLD AUTO: 27.6 %
MAN DIFF?: NORMAL
MCHC RBC-ENTMCNC: 30 PG
MCHC RBC-ENTMCNC: 31.8 GM/DL
MCV RBC AUTO: 94.3 FL
MONOCYTES # BLD AUTO: 1.47 K/UL
MONOCYTES NFR BLD AUTO: 10.8 %
N GONORRHOEA RRNA SPEC QL NAA+PROBE: NOT DETECTED
NEUTROPHILS # BLD AUTO: 8.22 K/UL
NEUTROPHILS NFR BLD AUTO: 60.7 %
NITRITE URINE: NEGATIVE
PH URINE: 5.5
PLATELET # BLD AUTO: 357 K/UL
POTASSIUM SERPL-SCNC: 3.9 MMOL/L
PROT SERPL-MCNC: 8.4 G/DL
PROTEIN URINE: NEGATIVE MG/DL
RBC # BLD: 4.7 M/UL
RBC # FLD: 14 %
SODIUM SERPL-SCNC: 141 MMOL/L
SOURCE AMPLIFICATION: NORMAL
SPECIFIC GRAVITY URINE: 1.04
TRIGL SERPL-MCNC: 82 MG/DL
UROBILINOGEN URINE: NEGATIVE MG/DL
VIRAL LOAD INTERP: NORMAL
VIRAL LOAD LOG: 5.49
WBC # FLD AUTO: 13.57 K/UL

## 2018-09-07 LAB
CULTURE RESULTS: SIGNIFICANT CHANGE UP
CULTURE RESULTS: SIGNIFICANT CHANGE UP
SPECIMEN SOURCE: SIGNIFICANT CHANGE UP
SPECIMEN SOURCE: SIGNIFICANT CHANGE UP
T PALLIDUM AB SER QL IA: POSITIVE

## 2018-09-08 DIAGNOSIS — Z79.899 OTHER LONG TERM (CURRENT) DRUG THERAPY: ICD-10-CM

## 2018-09-08 DIAGNOSIS — Z88.0 ALLERGY STATUS TO PENICILLIN: ICD-10-CM

## 2018-09-08 DIAGNOSIS — M79.1 MYALGIA: ICD-10-CM

## 2018-09-08 DIAGNOSIS — Z79.891 LONG TERM (CURRENT) USE OF OPIATE ANALGESIC: ICD-10-CM

## 2018-09-08 DIAGNOSIS — Z79.2 LONG TERM (CURRENT) USE OF ANTIBIOTICS: ICD-10-CM

## 2018-09-08 DIAGNOSIS — B20 HUMAN IMMUNODEFICIENCY VIRUS [HIV] DISEASE: ICD-10-CM

## 2018-09-10 PROBLEM — F15.10 METHAMPHETAMINE ABUSE: Status: ACTIVE | Noted: 2018-09-10

## 2018-09-10 NOTE — PHYSICAL EXAM
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
## 2018-09-10 NOTE — HISTORY OF PRESENT ILLNESS
[FreeTextEntry8] : This is a 28-year-old male referred from the emergency room for evaluation of neck, bilateral shoulder, and chest pain. Pain is associated with neuropathic symptoms such as pins and needles affecting both hands.\par He was previously evaluated in the emergency room on 9/2 and 9/4. Diagnoses of myalgia was made and treatment with Decadron injection was provided.\par While in the emergency room he underwent extended workup including CT of his abdomen/chest/head. All of these studies were essentially negative. Spinal tap was done with negative results. Urinalysis was positive for methamphetamine and patient admits to ongoing abuse of this drug although improved from previous use. Blood testing revealed a positive RPR at 1:4. Patient states this diagnosis was initially made about 2 or 3 years ago. He states that he has been treated on about 3 different occasions including IM antibiotic injection. However these treatments were intermittent as he never followed up to assess for fourfold decrease in RPR titer and patient does not know what his initial RPR titer was determined to be.

## 2018-09-20 ENCOUNTER — APPOINTMENT (OUTPATIENT)
Dept: INTERNAL MEDICINE | Facility: CLINIC | Age: 28
End: 2018-09-20

## 2018-10-02 ENCOUNTER — EMERGENCY (EMERGENCY)
Facility: HOSPITAL | Age: 28
LOS: 0 days | Discharge: HOME | End: 2018-10-02
Attending: EMERGENCY MEDICINE | Admitting: EMERGENCY MEDICINE

## 2018-10-02 VITALS
DIASTOLIC BLOOD PRESSURE: 55 MMHG | OXYGEN SATURATION: 99 % | TEMPERATURE: 97 F | SYSTOLIC BLOOD PRESSURE: 117 MMHG | RESPIRATION RATE: 18 BRPM | HEART RATE: 98 BPM

## 2018-10-02 VITALS — WEIGHT: 175.05 LBS | HEIGHT: 70 IN

## 2018-10-02 DIAGNOSIS — Z79.891 LONG TERM (CURRENT) USE OF OPIATE ANALGESIC: ICD-10-CM

## 2018-10-02 DIAGNOSIS — R21 RASH AND OTHER NONSPECIFIC SKIN ERUPTION: ICD-10-CM

## 2018-10-02 DIAGNOSIS — F30.8 OTHER MANIC EPISODES: ICD-10-CM

## 2018-10-02 DIAGNOSIS — L01.00 IMPETIGO, UNSPECIFIED: ICD-10-CM

## 2018-10-02 DIAGNOSIS — Z88.0 ALLERGY STATUS TO PENICILLIN: ICD-10-CM

## 2018-10-02 DIAGNOSIS — Z79.899 OTHER LONG TERM (CURRENT) DRUG THERAPY: ICD-10-CM

## 2018-10-02 DIAGNOSIS — B20 HUMAN IMMUNODEFICIENCY VIRUS [HIV] DISEASE: ICD-10-CM

## 2018-10-02 RX ADMIN — Medication 1 TABLET(S): at 16:06

## 2018-10-02 NOTE — ED ADULT NURSE NOTE - OBJECTIVE STATEMENT
Patient presents with what he believes is an infected hair follicle on the left lower rear portion of the scalp

## 2018-10-02 NOTE — ED PROVIDER NOTE - OBJECTIVE STATEMENT
29 yo male with PMH HIV presents c/o rash to scalp. Pt noted several red "bumps" and in back of head had yellowish crust with some swelling. Denied any drainage. No HA, fevers, neck pain. Denies any change in appetite, weakness or malaise.  Pt states he is staying with his boyfriend in [place], takes his meds but has not been on lithium since he is just back on antiretrovirals and was "getting things back in order". Pt denies any SI/HI or AVH.

## 2018-10-02 NOTE — ED PROVIDER NOTE - PHYSICAL EXAMINATION
VITAL SIGNS: noted  CONSTITUTIONAL: Well-developed; well-nourished; in no acute distress  HEAD: Normocephalic; atraumatic  EYES: PERRL, EOM intact; conjunctiva and sclera clear  ENT: No nasal discharge; airway clear. MMM  NECK: Supple; non tender. No anterior cervical lymphadenopathy noted  CARD: S1, S2 normal; no murmurs, gallops, or rubs. Regular rate and rhythm  RESP: CTAB/L, no wheezes, rales or rhonchi  ABD: Normal bowel sounds; soft; non-distended; non-tender; no hepatosplenomegaly. No CVA tenderness  EXT: Normal ROM. No calf tenderness or edema. Distal pulses intact  NEURO: Alert, oriented. Grossly unremarkable. No focal deficits  SKIN: Skin exam is warm and dry, several erythematous lesions noted on scalp, raised area with yellow crusting noted over occiput ~ 1 cm in diameter, mildly tender, no fluctuance or induration  PSYCH: pressured speech though able to have conversation, no thoughts of harm to self or others

## 2018-10-02 NOTE — ED PROVIDER NOTE - CARE PLAN
Principal Discharge DX:	Impetigo  Secondary Diagnosis:	Hypomania  Secondary Diagnosis:	HIV (human immunodeficiency virus infection)

## 2018-10-02 NOTE — ED PROVIDER NOTE - MEDICAL DECISION MAKING DETAILS
Pt aware he needed antibiotics to treat impetigo but eloped from ED during psychiatric evaluation prior to receipt of prescriptions and has not returned calls made to him on contact #.

## 2018-10-02 NOTE — ED PROVIDER NOTE - PROGRESS NOTE DETAILS
Pt diagnosed with impetigo.  Awaiting boyfriend to return to give pharmacy as not sure which he prefers. Offered pysch evaluation as pt hypomanic, agreed to consult. Pt reassessed and stefanie went ot go get food but was unsure of pharmacy.  Psych then evaluated pt, left ED after eval prior to dispo or giving me pharmacy to send rx. Pt did not prefer paper rx. Called pt's phone several times and left message to call me so I can send over rx's and he can follow with his psychiatrist. Pt has not called me back.  Called multiple times. Pt reported to me in ED he has an ID physician in [place].

## 2018-10-02 NOTE — ED PROVIDER NOTE - NS ED ROS FT
Constitutional: see HPI  Eyes:  No visual changes, eye pain or discharge.  ENMT:  No hearing changes, pain, discharge or infections. No neck pain or stiffness.  Cardiac:  No chest pain, SOB or edema. No chest pain with exertion.  Respiratory:  No cough or respiratory distress. No hemoptysis. No history of asthma or RAD.  GI:  No nausea, vomiting, diarrhea or abdominal pain.  :  No dysuria, frequency or burning.  MS:  No myalgia, muscle weakness, joint pain or back pain.  Neuro:  No headache or weakness.  No LOC.  Skin:  see HPI  Endocrine: No history of thyroid disease or diabetes.   PSYCH: see HPI

## 2018-10-02 NOTE — ED ADULT NURSE NOTE - NSIMPLEMENTINTERV_GEN_ALL_ED
Implemented All Universal Safety Interventions:  [place] to call system. Call bell, personal items and telephone within reach. Instruct patient to call for assistance. Room bathroom lighting operational. Non-slip footwear when patient is off stretcher. Physically safe environment: no spills, clutter or unnecessary equipment. Stretcher in lowest position, wheels locked, appropriate side rails in place.

## 2018-10-03 LAB
CULTURE RESULTS: SIGNIFICANT CHANGE UP
SPECIMEN SOURCE: SIGNIFICANT CHANGE UP

## 2018-11-30 ENCOUNTER — OUTPATIENT (OUTPATIENT)
Dept: OUTPATIENT SERVICES | Facility: HOSPITAL | Age: 28
LOS: 1 days | Discharge: HOME | End: 2018-11-30

## 2018-11-30 DIAGNOSIS — K02.63 DENTAL CARIES ON SMOOTH SURFACE PENETRATING INTO PULP: ICD-10-CM

## 2019-02-05 ENCOUNTER — EMERGENCY (EMERGENCY)
Facility: HOSPITAL | Age: 29
LOS: 1 days | Discharge: HOME | End: 2019-02-05
Admitting: EMERGENCY MEDICINE
Payer: MEDICAID

## 2019-02-05 VITALS
RESPIRATION RATE: 18 BRPM | OXYGEN SATURATION: 99 % | SYSTOLIC BLOOD PRESSURE: 112 MMHG | DIASTOLIC BLOOD PRESSURE: 78 MMHG | TEMPERATURE: 98 F | HEART RATE: 112 BPM

## 2019-02-05 VITALS
RESPIRATION RATE: 18 BRPM | HEART RATE: 105 BPM | SYSTOLIC BLOOD PRESSURE: 134 MMHG | OXYGEN SATURATION: 96 % | DIASTOLIC BLOOD PRESSURE: 78 MMHG | TEMPERATURE: 98 F

## 2019-02-05 LAB
ALBUMIN SERPL ELPH-MCNC: 3.7 G/DL — SIGNIFICANT CHANGE UP (ref 3.4–5)
ALP SERPL-CCNC: 89 U/L — SIGNIFICANT CHANGE UP (ref 40–120)
ALT FLD-CCNC: 31 U/L — SIGNIFICANT CHANGE UP (ref 12–42)
ANION GAP SERPL CALC-SCNC: 9 MMOL/L — SIGNIFICANT CHANGE UP (ref 9–16)
APPEARANCE UR: CLEAR — SIGNIFICANT CHANGE UP
APTT BLD: 39.8 SEC — HIGH (ref 27.5–36.3)
AST SERPL-CCNC: 14 U/L — LOW (ref 15–37)
BASOPHILS NFR BLD AUTO: 0.4 % — SIGNIFICANT CHANGE UP (ref 0–2)
BILIRUB SERPL-MCNC: 0.5 MG/DL — SIGNIFICANT CHANGE UP (ref 0.2–1.2)
BILIRUB UR-MCNC: NEGATIVE — SIGNIFICANT CHANGE UP
BUN SERPL-MCNC: 9 MG/DL — SIGNIFICANT CHANGE UP (ref 7–23)
CALCIUM SERPL-MCNC: 8.9 MG/DL — SIGNIFICANT CHANGE UP (ref 8.5–10.5)
CHLORIDE SERPL-SCNC: 97 MMOL/L — SIGNIFICANT CHANGE UP (ref 96–108)
CO2 SERPL-SCNC: 30 MMOL/L — SIGNIFICANT CHANGE UP (ref 22–31)
COLOR SPEC: YELLOW — SIGNIFICANT CHANGE UP
CREAT SERPL-MCNC: 0.98 MG/DL — SIGNIFICANT CHANGE UP (ref 0.5–1.3)
DIFF PNL FLD: NEGATIVE — SIGNIFICANT CHANGE UP
EOSINOPHIL NFR BLD AUTO: 0.9 % — SIGNIFICANT CHANGE UP (ref 0–6)
ETHANOL SERPL-MCNC: <3 MG/DL — SIGNIFICANT CHANGE UP
GLUCOSE SERPL-MCNC: 87 MG/DL — SIGNIFICANT CHANGE UP (ref 70–99)
GLUCOSE UR QL: NEGATIVE — SIGNIFICANT CHANGE UP
HCT VFR BLD CALC: 40.3 % — SIGNIFICANT CHANGE UP (ref 39–50)
HGB BLD-MCNC: 13.9 G/DL — SIGNIFICANT CHANGE UP (ref 13–17)
IMM GRANULOCYTES NFR BLD AUTO: 0.6 % — SIGNIFICANT CHANGE UP (ref 0–1.5)
INR BLD: 1.48 — HIGH (ref 0.88–1.16)
KETONES UR-MCNC: 40 MG/DL
LEUKOCYTE ESTERASE UR-ACNC: NEGATIVE — SIGNIFICANT CHANGE UP
LIDOCAIN IGE QN: 84 U/L — SIGNIFICANT CHANGE UP (ref 73–393)
LYMPHOCYTES # BLD AUTO: 28.4 % — SIGNIFICANT CHANGE UP (ref 13–44)
MAGNESIUM SERPL-MCNC: 2 MG/DL — SIGNIFICANT CHANGE UP (ref 1.6–2.6)
MCHC RBC-ENTMCNC: 30 PG — SIGNIFICANT CHANGE UP (ref 27–34)
MCHC RBC-ENTMCNC: 34.5 G/DL — SIGNIFICANT CHANGE UP (ref 32–36)
MCV RBC AUTO: 86.9 FL — SIGNIFICANT CHANGE UP (ref 80–100)
MONOCYTES NFR BLD AUTO: 10.9 % — SIGNIFICANT CHANGE UP (ref 2–14)
NEUTROPHILS NFR BLD AUTO: 58.8 % — SIGNIFICANT CHANGE UP (ref 43–77)
NITRITE UR-MCNC: NEGATIVE — SIGNIFICANT CHANGE UP
PCP SPEC-MCNC: SIGNIFICANT CHANGE UP
PH UR: 6 — SIGNIFICANT CHANGE UP (ref 5–8)
PLATELET # BLD AUTO: 276 K/UL — SIGNIFICANT CHANGE UP (ref 150–400)
POTASSIUM SERPL-MCNC: 3.4 MMOL/L — LOW (ref 3.5–5.3)
POTASSIUM SERPL-SCNC: 3.4 MMOL/L — LOW (ref 3.5–5.3)
PROT SERPL-MCNC: 9.2 G/DL — HIGH (ref 6.4–8.2)
PROT UR-MCNC: ABNORMAL MG/DL
PROTHROM AB SERPL-ACNC: 16.5 SEC — HIGH (ref 10–12.9)
RBC # BLD: 4.64 M/UL — SIGNIFICANT CHANGE UP (ref 4.2–5.8)
RBC # FLD: 11.8 % — SIGNIFICANT CHANGE UP (ref 10.3–14.5)
SODIUM SERPL-SCNC: 136 MMOL/L — SIGNIFICANT CHANGE UP (ref 132–145)
SP GR SPEC: >=1.03 — SIGNIFICANT CHANGE UP (ref 1–1.03)
UROBILINOGEN FLD QL: 1 E.U./DL — SIGNIFICANT CHANGE UP
WBC # BLD: 6.8 K/UL — SIGNIFICANT CHANGE UP (ref 3.8–10.5)
WBC # FLD AUTO: 6.8 K/UL — SIGNIFICANT CHANGE UP (ref 3.8–10.5)

## 2019-02-05 PROCEDURE — 99284 EMERGENCY DEPT VISIT MOD MDM: CPT

## 2019-02-05 PROCEDURE — 70450 CT HEAD/BRAIN W/O DYE: CPT | Mod: 26

## 2019-02-05 RX ORDER — SODIUM CHLORIDE 9 MG/ML
1000 INJECTION INTRAMUSCULAR; INTRAVENOUS; SUBCUTANEOUS ONCE
Qty: 0 | Refills: 0 | Status: COMPLETED | OUTPATIENT
Start: 2019-02-05 | End: 2019-02-05

## 2019-02-05 RX ORDER — ONDANSETRON 8 MG/1
4 TABLET, FILM COATED ORAL ONCE
Qty: 0 | Refills: 0 | Status: COMPLETED | OUTPATIENT
Start: 2019-02-05 | End: 2019-02-05

## 2019-02-05 RX ORDER — FAMOTIDINE 10 MG/ML
20 INJECTION INTRAVENOUS ONCE
Qty: 0 | Refills: 0 | Status: COMPLETED | OUTPATIENT
Start: 2019-02-05 | End: 2019-02-05

## 2019-02-05 RX ORDER — SODIUM CHLORIDE 9 MG/ML
1000 INJECTION, SOLUTION INTRAVENOUS
Qty: 0 | Refills: 0 | Status: DISCONTINUED | OUTPATIENT
Start: 2019-02-05 | End: 2019-02-09

## 2019-02-05 RX ADMIN — SODIUM CHLORIDE 1000 MILLILITER(S): 9 INJECTION INTRAMUSCULAR; INTRAVENOUS; SUBCUTANEOUS at 20:07

## 2019-02-05 RX ADMIN — SODIUM CHLORIDE 2000 MILLILITER(S): 9 INJECTION INTRAMUSCULAR; INTRAVENOUS; SUBCUTANEOUS at 18:30

## 2019-02-05 RX ADMIN — ONDANSETRON 4 MILLIGRAM(S): 8 TABLET, FILM COATED ORAL at 18:30

## 2019-02-05 RX ADMIN — FAMOTIDINE 20 MILLIGRAM(S): 10 INJECTION INTRAVENOUS at 18:30

## 2019-02-05 NOTE — ED PROVIDER NOTE - NEUROLOGICAL, MLM
Pt speaking with pressured speech. Pt speaking with pressured speech. alert to person and place; no focal deficits and with stable gait.

## 2019-02-05 NOTE — ED PROVIDER NOTE - MEDICAL DECISION MAKING DETAILS
pt with hx of hiv noncompliant presents with ams; +amphetamines and head ct neg.  pt otherwise well appearing however alert to person and place.  able to ambulate without any difficulty.  plan to continue to observe and d/c after metabolism of amphetamines that pt admits to using today.

## 2019-02-05 NOTE — ED PROVIDER NOTE - OBJECTIVE STATEMENT
28 y o male with PMHX of HIV, Syphilis (treated 2014), shingles, and non compliant with Lithium presents to ED for c/o abdominal pain and nausea x2 days. Pt showing some signs of AMS but denies drug use today. Denies speech deficits. HPI is limited due to pt being a poor historian. 28 y o male with PMHX of HIV, Syphilis (treated 2014), shingles, methamphetamine use, and non compliant with Lithium presents to ED for c/o abdominal pain and nausea x2 days. Pt showing some signs of AMS but denies drug use today. Denies speech deficits. HPI is limited due to pt being a poor historian. 28 y o male with PMHX of HIV, Syphilis (treated 2014), shingles, methamphetamine use, and non compliant with Lithium presents to ED for feeling "like shit". Pt alert to person and place, however is not clearly able to tell what day it is.  Per  from his facility pt appeared today without shoes and was complaining of abdominal pain and nausea however pt does not endorse this.  Pt with pressured speech and constantly repeating himself. HPI is limited due to pt being a poor historian.  Pt afebrile and in NAD.

## 2019-02-05 NOTE — ED PROVIDER NOTE - PROGRESS NOTE DETAILS
Kayleen Neely discharged to family members home accompanied by family member.   Patient provided with the following educational materials upon discharge:  AVS.   Valuables and belongings sent with patient.   AVS reviewed with patient and family member.  Patient and family member verbalized understanding. Patient left floor via wheelchair and transport assistance with all belongings.    Pt's : Gifty Rothman -  [phone]  On call sup.: Lenora Marie - [phone] pt now aaox3 and requesting to leave.  pt states he will walk 10 blocks to his apartment [address] and pt states he feels much better.  I called and spoke with on call social work Lenora who will follow with pt when he gets back to [facility].  she will also call to f/u rpr and lithium level

## 2019-02-05 NOTE — ED ADULT NURSE NOTE - OBJECTIVE STATEMENT
pt is a 29 y/o male presents to the ED for generalized abdominal pain with nausea and one episode vomiting. pt is poor historian, pt is A,A&Ox1, stutters during initial assessment and able to answer yes/no questions. pt in NAD, appears comfortable, ambulates around room with steady gait.

## 2019-02-05 NOTE — ED PROVIDER NOTE - PSYCHIATRIC, MLM
Pt awake, speaking with pressured speech. Pt awake, speaking with pressured speech, speaking in redundant sentences

## 2019-02-05 NOTE — ED ADULT NURSE NOTE - PLAN OF CARE DISCUSSED WITH:
Pt   calls: [phone] (home)     Complains of diarrhea x 10 days, no other symptoms. Denies any cramping, pain, N/V, bloating. States GF got sick same time and she is now better. Patient states he is staying very hydrated, drinking a lot of water. Discussed following BRAT diet as he states he hasn't changed his diet since being sick. Advised against anything spicy, fried, sugar, fruit or juices at that can increase diarrhea. Patient states bowel regimen was fairly regular prior to being sick. Patient to be seen in a day or two if not improving or if symptoms worsen. Patient is agreeable to plan.    Message handled by Nurse Triage.  Deanne De Leon RN     Patient

## 2019-02-05 NOTE — ED ADULT NURSE NOTE - NSIMPLEMENTINTERV_GEN_ALL_ED
Implemented All Universal Safety Interventions:  [place] to call system. Call bell, personal items and telephone within reach. Instruct patient to call for assistance. Room bathroom lighting operational. Non-slip footwear when patient is off stretcher. Physically safe environment: no spills, clutter or unnecessary equipment. Stretcher in lowest position, wheels locked, appropriate side rails in place.

## 2019-02-06 LAB
LITHIUM SERPL-MCNC: <.2 MMOL/L — LOW (ref 0.6–1.2)
T PALLIDUM AB TITR SER: POSITIVE

## 2019-02-06 RX ADMIN — SODIUM CHLORIDE 250 MILLILITER(S): 9 INJECTION, SOLUTION INTRAVENOUS at 00:30

## 2019-02-09 DIAGNOSIS — Z79.899 OTHER LONG TERM (CURRENT) DRUG THERAPY: ICD-10-CM

## 2019-02-09 DIAGNOSIS — Z79.2 LONG TERM (CURRENT) USE OF ANTIBIOTICS: ICD-10-CM

## 2019-02-09 DIAGNOSIS — F15.90 OTHER STIMULANT USE, UNSPECIFIED, UNCOMPLICATED: ICD-10-CM

## 2019-02-09 DIAGNOSIS — Z88.0 ALLERGY STATUS TO PENICILLIN: ICD-10-CM

## 2019-02-09 DIAGNOSIS — Z79.891 LONG TERM (CURRENT) USE OF OPIATE ANALGESIC: ICD-10-CM

## 2019-02-09 DIAGNOSIS — R10.9 UNSPECIFIED ABDOMINAL PAIN: ICD-10-CM

## 2019-06-03 PROBLEM — B20 HIV DISEASE: Status: ACTIVE | Noted: 2018-09-05

## 2019-07-29 NOTE — ED PROVIDER NOTE - RELIEVING FACTORS
Pediatric Endocrinology Consultation         Patient: Tate Self Date of Service: 2019   : 2010 MRN: [id number]     Chief Complaint :     SUBJECTIVE:   HISTORY OF PRESENT ILLNESS:  Tate Self is a 8 year old male who is seen in consultation for evaluation of of episodes of dizziness.  Recently mother was called to the school for an episode of near fainting and weak legs.  Patient had not lost consciousness at that time but felt that vision had got black.  He had not eaten anything since 5 PM the day before indicating no food intake for the past 15 hours.  Mother states that he is a picky eater and he has passed out previously as well in 2019 when he was also noted to have low blood sugar.  2 years ago he passed out in gym class and was not responding.  Mother reports that he eats in sports and she is not sure how much she eats that school.  He has been referred to a nutritionist.  There is history of heart issues per mother.  He has also been referred to neurology for evaluation.  Growth chart was reviewed.  His weight gain has been slow and in fact weight percentiles have gradually declined from 48 percentile to under the 3rd percentile.  At 2 years of age she was 48 percentile for weight and at his 6-year 10-month checkup was at the 2nd percentile for weight.  Lab evaluation done included CMP he was noted to have a glucose of 55 mg/DL when taken to the ER after episode of passing out.  Urinalysis was significant for ketonuria Subsequent labs were checked on 2019 and serum glucose was 90 mg/DL total cortisol was 11 MCG/DL on 2019, growth hormone was less than 0.1 NG/mL and serum insulin was 8.7M IUs/mL.  On this day he was not hypoglycemic and serum glucose was 90 mg/DL.  TSH was 0.63 and CBC was normal.  Bone age study was done at a chronologic age of 122 months bone age was 96 months    Birth History  Born by normal spontaneous vaginal delivery.  Maternal pregnancy healthy.    course unremarkable  PAST MEDICAL HISTORY:  History reviewed. No pertinent past medical history.  No history of respiratory problems  PAST SURGICAL HISTORY:  History reviewed. No pertinent surgical history.  No history of previous procedures   social History:  He is in third grade and a good student.  Developmental History:  No history of delayed developmental milestones  MEDICATIONS:  No current outpatient medications on file.     No current facility-administered medications for this visit.        ALLERGIES:  ALLERGIES:  No Known Allergies    FAMILY HISTORY:  Family History   Problem Relation Age of Onset   • Seizure Disorder Mother    • Diabetes Maternal Grandfather        SOCIAL HISTORY:  Social History     Tobacco Use   • Smoking status: Never Smoker   • Smokeless tobacco: Never Used   Substance Use Topics   • Alcohol use: Not on file   • Drug use: Not on file      Social History     Social History Narrative   • Not on file       Review of Systems   Constitutional: Negative for activity change and fatigue.   HENT: Negative for congestion, sore throat and trouble swallowing.    Eyes: Negative for visual disturbance.   Respiratory: Negative for cough and shortness of breath.    Cardiovascular: Negative for chest pain and palpitations.   Gastrointestinal: Negative for abdominal pain, constipation and diarrhea.   Endocrine: Negative for cold intolerance, heat intolerance, polydipsia, polyphagia and polyuria.   Genitourinary: Negative for difficulty urinating and frequency.   Musculoskeletal: Negative for arthralgias and joint swelling.   Skin: Negative for rash.   Neurological: Negative for dizziness, tremors and headaches.   Hematological: Does not bruise/bleed easily.   Psychiatric/Behavioral: Negative for behavioral problems and sleep disturbance.         OBJECTIVE:     Vitals:   Vitals:    19 1017   BP: 95/57   Pulse: 75     <1 %ile (Z= -2.44) based on CDC (Boys, 2-20 Years) weight-for-age data  using vitals from 7/19/2019.  2 %ile (Z= -2.02) based on CDC (Boys, 2-20 Years) Stature-for-age data based on Stature recorded on 7/19/2019.  Body surface area is 0.93 meters squared.  Blood pressure percentiles are 40 % systolic and 41 % diastolic based on the August 2017 AAP Clinical Practice Guideline.   6 %ile (Z= -1.59) based on CDC (Boys, 2-20 Years) BMI-for-age based on BMI available as of 7/19/2019.    Physical Exam   Constitutional: He appears well-developed and well-nourished. He is active.   HENT:   Mouth/Throat: Mucous membranes are moist.   Eyes: Pupils are equal, round, and reactive to light. EOM are normal.   Neck: Normal range of motion. Neck supple. Thyroid normal.   Cardiovascular: Regular rhythm, S1 normal and S2 normal.   No murmur heard.  Pulmonary/Chest: Breath sounds normal. No respiratory distress.   Abdominal: Soft. He exhibits no mass. There is no hepatosplenomegaly. There is no tenderness.   Genitourinary:   Genitourinary Comments: Genitalia normal.  Testes descended bilaterally.  Pubic hair Jose stage I   Musculoskeletal: Normal range of motion. He exhibits no deformity.   Neurological: He is alert. He displays normal reflexes. He exhibits normal muscle tone.   Skin: Skin is warm.           DIAGNOSTIC STUDIES:   LAB RESULTS:  No results found for this or any previous visit (from the past 5040 hour(s)).        Assessment    This is a 8 year old year-old male who presents with episodes of dizziness.  At a recent episode of dizziness he was noted to have a serum blood sugar of 55 mg/DL.  He had not eaten for 15 hours and urine analysis also demonstrated ketosis.  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  He does not eat for long periods of time.  Importance of 3 meals and 3 snacks with complex carbohydrate every day supervised either at school or at home discussed with  mother.  Symptoms of hypoglycemia reviewed.  And advised that should he have similar symptoms to be evaluated in the emergency room and recheck of labs with serum glucose, cortisol, and insulin to assess for hyperinsulinism, cortisol deficiency and growth hormone deficiency.  Last set of labs were not done when he was hypoglycemic but did demonstrate appropriate levels of cortisol.  He is short and bone age is delayed.  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  Mid parenteral height calculated at 5 feet 1 inch and his height seems appropriate for his genetics.    1. Dizziness    2. Short stature (child)    3. Hypoglycemia    4. Delayed bone age    5. Poor weight gain (0-17)        PLAN:     No follow-ups on file.  1. Patient to have 3 meals and 3 snacks as discussed  2.  School to keep a log of food intake and report to mother  3.  Nutritional evaluation  4.  If passes out again labs to be checked for cortisol, growth hormone, insulin and glucose  5.  IGF-I and IGFBP-3 and bone age to be done today  6.  Inform physician if has another episode of passing out  7.  Follow-up in pediatric endocrine clinic in 6 months.  Instructions provided as documented in the AVS.    The patient and mother indicated understanding of the diagnosis and agreed with the plan of care.        Raysa Chen MD             none

## 2019-08-19 NOTE — ED ADULT NURSE NOTE - NS ED NURSE LEVEL OF CONSCIOUSNESS ORIENTATION
Airway  Urgency: elective      General Information and Staff    Patient location during procedure: OR  Anesthesiologist: Sarita Gaspar MD  Resident/CRNA: Jackie Mcleod CRNA  Performed: anesthesiologist     Indications and Patient Condition  Indica Disoriented

## 2019-09-27 ENCOUNTER — TRANSCRIPTION ENCOUNTER (OUTPATIENT)
Age: 29
End: 2019-09-27

## 2019-10-04 NOTE — HEALTH RISK ASSESSMENT
Patient would like her mother to be called to scheduled the induction because her phone isn't working.  Her mother's phone is [phone]   [] : Yes

## 2019-10-27 ENCOUNTER — EMERGENCY (EMERGENCY)
Facility: HOSPITAL | Age: 29
LOS: 1 days | Discharge: HOME | End: 2019-10-27
Attending: EMERGENCY MEDICINE | Admitting: EMERGENCY MEDICINE
Payer: MEDICAID

## 2019-10-27 VITALS
RESPIRATION RATE: 18 BRPM | OXYGEN SATURATION: 100 % | SYSTOLIC BLOOD PRESSURE: 127 MMHG | TEMPERATURE: 98 F | DIASTOLIC BLOOD PRESSURE: 78 MMHG | HEART RATE: 94 BPM

## 2019-10-27 PROCEDURE — 99284 EMERGENCY DEPT VISIT MOD MDM: CPT

## 2019-10-27 RX ORDER — VALACYCLOVIR 500 MG/1
1 TABLET, FILM COATED ORAL
Qty: 21 | Refills: 0
Start: 2019-10-27 | End: 2019-11-02

## 2019-10-27 RX ORDER — IBUPROFEN 200 MG
600 TABLET ORAL ONCE
Refills: 0 | Status: COMPLETED | OUTPATIENT
Start: 2019-10-27 | End: 2019-10-27

## 2019-10-27 RX ORDER — VALACYCLOVIR 500 MG/1
1000 TABLET, FILM COATED ORAL ONCE
Refills: 0 | Status: COMPLETED | OUTPATIENT
Start: 2019-10-27 | End: 2019-10-27

## 2019-10-27 RX ORDER — IBUPROFEN 200 MG
1 TABLET ORAL
Qty: 20 | Refills: 0
Start: 2019-10-27 | End: 2019-10-31

## 2019-10-27 RX ADMIN — Medication 40 MILLIGRAM(S): at 23:27

## 2019-10-27 RX ADMIN — Medication 600 MILLIGRAM(S): at 23:27

## 2019-10-27 RX ADMIN — VALACYCLOVIR 1000 MILLIGRAM(S): 500 TABLET, FILM COATED ORAL at 23:27

## 2019-10-27 NOTE — ED PROVIDER NOTE - SKIN, MLM
B/L mid soles violaceous lesion (dx w kaposi recently), R foot in toes has erthema w one isolated vesicle, no clear dermatomal pattern

## 2019-10-27 NOTE — ED PROVIDER NOTE - OBJECTIVE STATEMENT
29 male pt, hx of HIV, meth abuse, recently dx w kaposi sarcoma, recently started on HIV meds again and bactrim. Presents w R foot painful red rash. States started today. Kaposi lesions recently dx on both feet (soles). States the pain is a shooting pain similar to when he has had shingles in the past. no fever, no chills, no pus, no crepitus. No trauma, no swelling

## 2019-10-27 NOTE — ED ADULT NURSE NOTE - NSIMPLEMENTINTERV_GEN_ALL_ED
Implemented All Universal Safety Interventions:  [place] to call system. Call bell, personal items and telephone within reach. Instruct patient to call for assistance. Room bathroom lighting operational. Non-slip footwear when patient is off stretcher. Physically safe environment: no spills, clutter or unnecessary equipment. Stretcher in lowest position, wheels locked, appropriate side rails in place.

## 2019-10-27 NOTE — ED PROVIDER NOTE - PATIENT PORTAL LINK FT
You can access the FollowMyHealth Patient Portal offered by [facility] by registering at the following website: [email or web address]. By joining [employer]’s FollowMyHealth portal, you will also be able to view your health information using other applications (apps) compatible with our system.

## 2019-10-27 NOTE — ED ADULT TRIAGE NOTE - CHIEF COMPLAINT QUOTE
Pt presents to ED c/o right foot pain with redness and swelling x2 days. Pt reports shingles in the past and pain feels similar. Denies any recent traumas to foot. Hx of HIV

## 2019-10-27 NOTE — ED PROVIDER NOTE - CLINICAL SUMMARY MEDICAL DECISION MAKING FREE TEXT BOX
R lower extremity rash, pt states it feels like similar neuropathic pain from prior shingles. Seems similar in quality to prior dx kaposi in feet. Will cover for possibility of early shingles. Encouraged f/u w [facility] this week. Valtrex/motrin/prednisone.

## 2019-10-31 DIAGNOSIS — R21 RASH AND OTHER NONSPECIFIC SKIN ERUPTION: ICD-10-CM

## 2019-10-31 DIAGNOSIS — M79.671 PAIN IN RIGHT FOOT: ICD-10-CM

## 2019-11-13 ENCOUNTER — EMERGENCY (EMERGENCY)
Facility: HOSPITAL | Age: 29
LOS: 1 days | Discharge: HOME | End: 2019-11-13
Attending: EMERGENCY MEDICINE | Admitting: EMERGENCY MEDICINE
Payer: MEDICAID

## 2019-11-13 VITALS
HEIGHT: 73 IN | RESPIRATION RATE: 17 BRPM | OXYGEN SATURATION: 98 % | HEART RATE: 80 BPM | WEIGHT: 179.9 LBS | SYSTOLIC BLOOD PRESSURE: 143 MMHG | TEMPERATURE: 98 F | DIASTOLIC BLOOD PRESSURE: 82 MMHG

## 2019-11-13 VITALS
HEART RATE: 88 BPM | SYSTOLIC BLOOD PRESSURE: 127 MMHG | DIASTOLIC BLOOD PRESSURE: 75 MMHG | TEMPERATURE: 98 F | OXYGEN SATURATION: 98 % | RESPIRATION RATE: 16 BRPM

## 2019-11-13 DIAGNOSIS — F17.200 NICOTINE DEPENDENCE, UNSPECIFIED, UNCOMPLICATED: ICD-10-CM

## 2019-11-13 DIAGNOSIS — M79.673 PAIN IN UNSPECIFIED FOOT: ICD-10-CM

## 2019-11-13 DIAGNOSIS — Z88.0 ALLERGY STATUS TO PENICILLIN: ICD-10-CM

## 2019-11-13 DIAGNOSIS — C46.1 KAPOSI'S SARCOMA OF SOFT TISSUE: ICD-10-CM

## 2019-11-13 PROCEDURE — 99283 EMERGENCY DEPT VISIT LOW MDM: CPT

## 2019-11-13 RX ORDER — GABAPENTIN 400 MG/1
1 CAPSULE ORAL
Qty: 21 | Refills: 0
Start: 2019-11-13 | End: 2019-11-19

## 2019-11-13 RX ORDER — GABAPENTIN 400 MG/1
300 CAPSULE ORAL ONCE
Refills: 0 | Status: COMPLETED | OUTPATIENT
Start: 2019-11-13 | End: 2019-11-13

## 2019-11-13 RX ORDER — LIDOCAINE 4 G/100G
1 CREAM TOPICAL
Qty: 1 | Refills: 0
Start: 2019-11-13 | End: 2019-11-19

## 2019-11-13 RX ORDER — LIDOCAINE 4 G/100G
1 CREAM TOPICAL ONCE
Refills: 0 | Status: COMPLETED | OUTPATIENT
Start: 2019-11-13 | End: 2019-11-13

## 2019-11-13 RX ADMIN — LIDOCAINE 1 APPLICATION(S): 4 CREAM TOPICAL at 09:06

## 2019-11-13 RX ADMIN — GABAPENTIN 300 MILLIGRAM(S): 400 CAPSULE ORAL at 09:06

## 2019-11-13 NOTE — ED PROVIDER NOTE - OBJECTIVE STATEMENT
28 y/o M w/hx HIV intermittently on HAART w/hx syphilis, shingles, kaposi sarcoma, frequent meth use, seen at [facility] for painful rash to feet 2wks ago suspicious for kaposi's or early shingles, treated with gabapentin and valcyclavir. Pt reports no significant improvement in sx, worsening pain with walking and expanding area of rash to soles of feet. Initially was finding improvement with the gabapentin but no longer is controlling the pain. Has f/u today with [facility] but presented to ED hoping for pain control.

## 2019-11-13 NOTE — ED ADULT TRIAGE NOTE - CHIEF COMPLAINT QUOTE
Bilateral food pain for several days. Taking gabapentin steroids and antibiotics, unknown name. History of sarcoma.

## 2019-11-13 NOTE — ED PROVIDER NOTE - PATIENT PORTAL LINK FT
You can access the FollowMyHealth Patient Portal offered by [facility] by registering at the following website: [email or web address]. By joining [employer]’s FollowMyHealth portal, you will also be able to view your health information using other applications (apps) compatible with our system.

## 2019-11-13 NOTE — ED PROVIDER NOTE - NSFOLLOWUPINSTRUCTIONS_ED_ALL_ED_FT
Log Out.    [employer] CareNotes®     :  [facility]             KAPOSI'S SARCOMA - AfterCare(R) Instructions(ER/ED)     Kaposi's Sarcoma    WHAT YOU NEED TO KNOW:    Kaposi sarcoma (KS) is a type of skin cancer. KS may appear on any part of your skin. It can also be found in your lymph nodes, stomach, intestines, liver, spleen, lungs, and bones. KS may start in one area and spread to other areas. This cancer is most common in people who have HIV, AIDS, or a human herpes virus-8 (HHV-8) infection. People who have had an organ transplant may also get KS.    DISCHARGE INSTRUCTIONS:    Medicines:     Medicines may be given to decrease pain. You may also get medicine to put on your skin to stop cancer cells from growing and to kill new cancer cells. If you have HIV or AIDS, you will be given medicine to treat the viral infection.      Take your medicine as directed. Contact your healthcare provider if you think your medicine is not helping or if you have side effects. Tell him or her if you are allergic to any medicine. Keep a list of the medicines, vitamins, and herbs you take. Include the amounts, and when and why you take them. Bring the list or the pill bottles to follow-up visits. Carry your medicine list with you in case of an emergency.    Follow up with your healthcare provider or oncologist as directed: Write down your questions so you remember to ask them during your visits.     Manage your symptoms of KS:     Care for your mouth. Brush your teeth twice daily. Floss your teeth regularly, and use mouthwash. This may decrease your risk for mouth pain and trouble eating and swallowing.       Eat healthy foods. Healthy foods include fruits, vegetables, whole-grain breads, low-fat dairy products, beans, lean meats, and fish. Ask if you need to be on a special diet.      Wear support stockings and elevate your legs as directed by your healthcare provider to decrease swelling. Support stockings are also called compression garments. Support stockings are often worn during the day and removed at night. Ask your healthcare provider how to care for your skin.      Go to physical therapy as directed. A physical therapist teaches you exercises to help improve movement and strength, and to decrease pain.     Contact your healthcare provider or oncologist if:     You have a fever.       You get more sores on your skin, or they are more painful or itch.       You have increased fatigue or weakness.       Your hands and feet are itchy, swollen, or painful.      You have numbness or tingling in your hand or foot.       You have trouble eating or swallowing.       You have nausea or vomiting that will not stop.       You have diarrhea or constipation, or blood in your bowel movement.      Your leg is swollen and painful and makes it difficult to walk.      You have bone pain or increased headaches.      You cannot control when you urinate.      You have questions or concerns about your condition or care.    Return to the emergency department if:     You have trouble breathing or cough up blood.

## 2019-11-13 NOTE — ED PROVIDER NOTE - PHYSICAL EXAMINATION
VITAL SIGNS: I have reviewed nursing notes and confirm.  CONSTITUTIONAL: Well-developed; well-nourished; in no acute distress.  SKIN: Skin exam is warm and dry, + dark erythematous non-blanching macular rash to soles of feet which are TTP  HEAD: Normocephalic; atraumatic.  EYES: PERRL, EOM intact; conjunctiva and sclera clear.  ENT: No nasal discharge; airway clear.  NECK: Supple; non tender.  CARD: S1, S2 normal; no murmurs, gallops, or rubs. Regular rate and rhythm.  RESP: Unlabored. No wheezes, rales or rhonchi.  ABD: soft; non-distended; non-tender  EXT: Normal ROM. No cyanosis or edema. Non-ttp all ext, distal pulses intact  NEURO: Alert, oriented. Grossly unremarkable.  PSYCH: Cooperative, appropriate.

## 2019-11-13 NOTE — ED PROVIDER NOTE - CLINICAL SUMMARY MEDICAL DECISION MAKING FREE TEXT BOX
Rash consistent with worsening kaposi sarcoma. While pt doesn't know his exact T-cell/CD4 count, he reports that it is "very low." He is going directly to [facility] for f/u today. will increase dose of gabapentin, c/w topical lidocaine. given return precautions.

## 2020-01-14 NOTE — ED PROVIDER NOTE - CROS ED CONS ALL NEG
Mom would like to discuss concerns regarding pt's sleeping.    Patient Name: Daryl Andersen  Caller Name: JEFF ANDERSEN    Callback Number: [phone]  Best Availability: anytime    Did you confirm the message with the caller?: yes    Thank you,  Madison Villarreal   - - -

## 2020-05-07 ENCOUNTER — EMERGENCY (EMERGENCY)
Facility: HOSPITAL | Age: 30
LOS: 1 days | Discharge: HOME | End: 2020-05-07
Admitting: EMERGENCY MEDICINE
Payer: MEDICAID

## 2020-05-07 VITALS
HEIGHT: 68 IN | WEIGHT: 145.06 LBS | TEMPERATURE: 99 F | OXYGEN SATURATION: 98 % | RESPIRATION RATE: 18 BRPM | SYSTOLIC BLOOD PRESSURE: 125 MMHG | DIASTOLIC BLOOD PRESSURE: 68 MMHG | HEART RATE: 116 BPM

## 2020-05-07 DIAGNOSIS — B20 HUMAN IMMUNODEFICIENCY VIRUS [HIV] DISEASE: ICD-10-CM

## 2020-05-07 DIAGNOSIS — Z88.0 ALLERGY STATUS TO PENICILLIN: ICD-10-CM

## 2020-05-07 LAB
ALBUMIN SERPL ELPH-MCNC: 3.8 G/DL — SIGNIFICANT CHANGE UP (ref 3.4–5)
ALP SERPL-CCNC: 86 U/L — SIGNIFICANT CHANGE UP (ref 40–120)
ALT FLD-CCNC: 37 U/L — SIGNIFICANT CHANGE UP (ref 12–42)
AMPHET UR-MCNC: POSITIVE
ANION GAP SERPL CALC-SCNC: 10 MMOL/L — SIGNIFICANT CHANGE UP (ref 9–16)
AST SERPL-CCNC: 28 U/L — SIGNIFICANT CHANGE UP (ref 15–37)
BARBITURATES UR SCN-MCNC: NEGATIVE — SIGNIFICANT CHANGE UP
BASOPHILS # BLD AUTO: 0.01 K/UL — SIGNIFICANT CHANGE UP (ref 0–0.2)
BASOPHILS NFR BLD AUTO: 0.2 % — SIGNIFICANT CHANGE UP (ref 0–2)
BENZODIAZ UR-MCNC: NEGATIVE — SIGNIFICANT CHANGE UP
BILIRUB SERPL-MCNC: 0.7 MG/DL — SIGNIFICANT CHANGE UP (ref 0.2–1.2)
BUN SERPL-MCNC: 18 MG/DL — SIGNIFICANT CHANGE UP (ref 7–23)
CALCIUM SERPL-MCNC: 9 MG/DL — SIGNIFICANT CHANGE UP (ref 8.5–10.5)
CHLORIDE SERPL-SCNC: 98 MMOL/L — SIGNIFICANT CHANGE UP (ref 96–108)
CO2 SERPL-SCNC: 27 MMOL/L — SIGNIFICANT CHANGE UP (ref 22–31)
COCAINE METAB.OTHER UR-MCNC: NEGATIVE — SIGNIFICANT CHANGE UP
CREAT SERPL-MCNC: 1.15 MG/DL — SIGNIFICANT CHANGE UP (ref 0.5–1.3)
EOSINOPHIL # BLD AUTO: 0.02 K/UL — SIGNIFICANT CHANGE UP (ref 0–0.5)
EOSINOPHIL NFR BLD AUTO: 0.3 % — SIGNIFICANT CHANGE UP (ref 0–6)
ETHANOL SERPL-MCNC: <3 MG/DL — SIGNIFICANT CHANGE UP
GLUCOSE SERPL-MCNC: 123 MG/DL — HIGH (ref 70–99)
HCT VFR BLD CALC: 32.8 % — LOW (ref 39–50)
HGB BLD-MCNC: 11.4 G/DL — LOW (ref 13–17)
IMM GRANULOCYTES NFR BLD AUTO: 0.8 % — SIGNIFICANT CHANGE UP (ref 0–1.5)
LYMPHOCYTES # BLD AUTO: 0.8 K/UL — LOW (ref 1–3.3)
LYMPHOCYTES # BLD AUTO: 13.3 % — SIGNIFICANT CHANGE UP (ref 13–44)
MCHC RBC-ENTMCNC: 29.3 PG — SIGNIFICANT CHANGE UP (ref 27–34)
MCHC RBC-ENTMCNC: 34.8 GM/DL — SIGNIFICANT CHANGE UP (ref 32–36)
MCV RBC AUTO: 84.3 FL — SIGNIFICANT CHANGE UP (ref 80–100)
METHADONE UR-MCNC: NEGATIVE — SIGNIFICANT CHANGE UP
MONOCYTES # BLD AUTO: 0.3 K/UL — SIGNIFICANT CHANGE UP (ref 0–0.9)
MONOCYTES NFR BLD AUTO: 5 % — SIGNIFICANT CHANGE UP (ref 2–14)
NEUTROPHILS # BLD AUTO: 4.84 K/UL — SIGNIFICANT CHANGE UP (ref 1.8–7.4)
NEUTROPHILS NFR BLD AUTO: 80.4 % — HIGH (ref 43–77)
NRBC # BLD: 0 /100 WBCS — SIGNIFICANT CHANGE UP (ref 0–0)
OPIATES UR-MCNC: NEGATIVE — SIGNIFICANT CHANGE UP
PCP SPEC-MCNC: SIGNIFICANT CHANGE UP
PCP UR-MCNC: NEGATIVE — SIGNIFICANT CHANGE UP
PLATELET # BLD AUTO: 342 K/UL — SIGNIFICANT CHANGE UP (ref 150–400)
POTASSIUM SERPL-MCNC: 3.4 MMOL/L — LOW (ref 3.5–5.3)
POTASSIUM SERPL-SCNC: 3.4 MMOL/L — LOW (ref 3.5–5.3)
PROT SERPL-MCNC: 9.1 G/DL — HIGH (ref 6.4–8.2)
RBC # BLD: 3.89 M/UL — LOW (ref 4.2–5.8)
RBC # FLD: 11.9 % — SIGNIFICANT CHANGE UP (ref 10.3–14.5)
SODIUM SERPL-SCNC: 135 MMOL/L — SIGNIFICANT CHANGE UP (ref 132–145)
THC UR QL: NEGATIVE — SIGNIFICANT CHANGE UP
WBC # BLD: 6.02 K/UL — SIGNIFICANT CHANGE UP (ref 3.8–10.5)
WBC # FLD AUTO: 6.02 K/UL — SIGNIFICANT CHANGE UP (ref 3.8–10.5)

## 2020-05-07 PROCEDURE — L9991: CPT

## 2020-05-07 NOTE — ED ADULT NURSE NOTE - CHPI ED NUR SYMPTOMS NEG
no blood in mucus/no bleeding at site/no purulent drainage/no chills/no drainage/no fever/no rectal pain/no vomiting

## 2020-05-07 NOTE — ED PROVIDER NOTE - CLINICAL SUMMARY MEDICAL DECISION MAKING FREE TEXT BOX
LWBS by primary provider   Patient was rapidly assessed by night team and left during change of shift  Multiple attempts to find/contact   left his avila at chair

## 2020-05-07 NOTE — ED ADULT NURSE NOTE - NSIMPLEMENTINTERV_GEN_ALL_ED
Implemented All Universal Safety Interventions:  [place] to call system. Call bell, personal items and telephone within reach. Instruct patient to call for assistance. Room bathroom lighting operational. Non-slip footwear when patient is off stretcher. Physically safe environment: no spills, clutter or unnecessary equipment. Stretcher in lowest position, wheels locked, appropriate side rails in place.

## 2020-05-07 NOTE — ED ADULT NURSE REASSESSMENT NOTE - NS ED NURSE REASSESS COMMENT FT1
Pt laying on stretcher, states he is comfortable. Breakfast ordered for pt. Awaiting AM IV dose of Flagyl. Will continue to monitor. Side rails up, safety is maintained.

## 2020-05-07 NOTE — ED ADULT NURSE NOTE - OBJECTIVE STATEMENT
31 y/o M c/o B/L foot pn which he believes is from scabies. Pt states he was recently diagnosed with Kaposi sarcoma and is supposed to start first chemo session 5/14. Pt said he has had cancer in past and had scabies that time as well. Pt has visible circular, red, raised bumps on arms and neck. Pt denies recent illness, fever, CP, SOB, N/V/D. PMH of HIV, shingles, and syphilis. Pt speaking very quickly and tangentially.

## 2020-05-07 NOTE — ED PROVIDER NOTE - RAPID ASSESSMENT
30 y.o male with hx HIV hx crystal meth use, possible bipolar, currently has advanced HIV and kaposis sarcoma, had admitted to [place] and was recently dced.  Hx obtained by . HEATHER [phone].  PCP DR JAUREGUI at [facility]. Today called 911 for paranoia, on EMS arrival pt was scratching and they suspected scabies. Will do rapid assessment and check labs, plan for day team to re-eval.

## 2020-05-22 ENCOUNTER — EMERGENCY (EMERGENCY)
Facility: HOSPITAL | Age: 30
LOS: 1 days | Discharge: HOME | End: 2020-05-22
Attending: EMERGENCY MEDICINE | Admitting: EMERGENCY MEDICINE
Payer: MEDICAID

## 2020-05-22 VITALS — DIASTOLIC BLOOD PRESSURE: 62 MMHG | HEART RATE: 99 BPM | SYSTOLIC BLOOD PRESSURE: 112 MMHG

## 2020-05-22 VITALS
RESPIRATION RATE: 16 BRPM | DIASTOLIC BLOOD PRESSURE: 60 MMHG | TEMPERATURE: 98 F | OXYGEN SATURATION: 97 % | SYSTOLIC BLOOD PRESSURE: 107 MMHG | HEART RATE: 116 BPM

## 2020-05-22 DIAGNOSIS — G89.29 OTHER CHRONIC PAIN: ICD-10-CM

## 2020-05-22 DIAGNOSIS — M25.571 PAIN IN RIGHT ANKLE AND JOINTS OF RIGHT FOOT: ICD-10-CM

## 2020-05-22 DIAGNOSIS — Z88.0 ALLERGY STATUS TO PENICILLIN: ICD-10-CM

## 2020-05-22 DIAGNOSIS — U07.1 COVID-19: ICD-10-CM

## 2020-05-22 PROCEDURE — 99284 EMERGENCY DEPT VISIT MOD MDM: CPT

## 2020-05-22 PROCEDURE — 73610 X-RAY EXAM OF ANKLE: CPT | Mod: 26,RT

## 2020-05-22 RX ORDER — ACETAMINOPHEN 500 MG
975 TABLET ORAL ONCE
Refills: 0 | Status: COMPLETED | OUTPATIENT
Start: 2020-05-22 | End: 2020-05-22

## 2020-05-22 RX ORDER — IBUPROFEN 200 MG
1 TABLET ORAL
Qty: 20 | Refills: 0
Start: 2020-05-22 | End: 2020-05-26

## 2020-05-22 RX ORDER — IBUPROFEN 200 MG
800 TABLET ORAL ONCE
Refills: 0 | Status: COMPLETED | OUTPATIENT
Start: 2020-05-22 | End: 2020-05-22

## 2020-05-22 RX ORDER — LIDOCAINE 4 G/100G
1 CREAM TOPICAL
Qty: 1 | Refills: 0
Start: 2020-05-22 | End: 2020-05-28

## 2020-05-22 RX ADMIN — Medication 800 MILLIGRAM(S): at 13:04

## 2020-05-22 RX ADMIN — Medication 975 MILLIGRAM(S): at 13:04

## 2020-05-22 NOTE — ED PROVIDER NOTE - PATIENT PORTAL LINK FT
You can access the FollowMyHealth Patient Portal offered by [facility] by registering at the following website: [email or web address]. By joining [employer]’s FollowMyHealth portal, you will also be able to view your health information using other applications (apps) compatible with our system.

## 2020-05-22 NOTE — ED PROVIDER NOTE - CLINICAL SUMMARY MEDICAL DECISION MAKING FREE TEXT BOX
29 y/o male undomiciled with h/o chronic pain, HIV ,AIDS and Kaposi sarcoma. Today c/o right ankle pain. Patient is in good conditions, no medial or lateral malleolar tenderness full ROM, there is no presence of ecchymosis or blanching erythema. Will provide patient with pain meds, do not suspect septic joint, r/o acute bony injury. Likely exacerbation of chronic pain vs, pain from Kaposi lesion, anticipate discharge.

## 2020-05-22 NOTE — ED PROVIDER NOTE - MUSCULOSKELETAL, MLM
Erythematus raised purpuric lesions scattered across the entire body consist with kaposi sarcoma. There are some tender lesions  including a 3x 8 cm on his right heel. reportedly "unchanged from baseline"  No medial or lateral malleolar tenderness full ROM. There is no presence of ecchymosis or blanching erythema distal pulse intact.

## 2020-05-22 NOTE — ED PROVIDER NOTE - CARE PROVIDER_API CALL
Kermit Traore)  Orthopedics  [address] [place] [address]  Phone: [phone]  Fax: [phone]  Follow Up Time:

## 2020-05-22 NOTE — ED PROVIDER NOTE - OBJECTIVE STATEMENT
29 y/o undomiciled male with h/o polysubstance abuse. HIV AIDS, intermittently compliant with HAART, bipolar disorder, and kaposi sarcoma. Presents to the ED c/o several days of right ankle pain, patient doesn't remember if he fell and doesn't not recall  how the pain began but has been slowly progressing and is painful to walk. Patient states he typically ambulates with a cane. However patient states he left his at the ED two weeks ago when left prior to being seen. States that  walking w/o the  avila has aggravate his pain to the right ankle. Denies swelling of the right leg, redness, bruising. Denies changes in sensation, respiratory complaints, CP, SOB, chills, No fever. No SI or HI. In addition patient  has been compliant with psychiatric medication. 31 y/o undomiciled male with h/o polysubstance abuse. HIV AIDS, intermittently compliant with HAART, bipolar disorder, and kaposi sarcoma. Presents to the ED c/o several days of right ankle pain, patient doesn't remember if he fell and doesn't not recall  how the pain began but has been slowly progressing and is painful to walk. Patient states he typically ambulates with a cane. However at his last visit to the ED two weeks ago he left his cane as he left prior to being seen. States that  walking w/o the  avila has aggravate his pain to the right ankle. Denies swelling of the right leg, redness, bruising. Denies changes in sensation, respiratory complaints, CP, SOB, chills, No fever. No SI or HI. In addition patient  has been compliant with psychiatric medication.

## 2020-05-22 NOTE — ED PROVIDER NOTE - NSFOLLOWUPINSTRUCTIONS_ED_ALL_ED_FT
Log Out.    One-Song CareNotes®     :  [facility]             ARTHRALGIA - AfterCare(R) Instructions(ER/ED)     Arthralgia    WHAT YOU NEED TO KNOW:    Arthralgia is pain in one or more joints, with no inflammation. It may be short-term and get better within 6 to 8 weeks. Arthralgia can be an early sign of arthritis. Arthralgia may be caused by a medical condition, such as a hormone disorder or a tumor. It may also be caused by an infection or injury.     DISCHARGE INSTRUCTIONS:    Medicines: The following medicines may be ordered for you:     Acetaminophen decreases pain. Ask how much to take and how often to take it. Follow directions. Acetaminophen can cause liver damage if not taken correctly.      NSAIDs decrease pain and prevent swelling. Ask your healthcare provider which medicine is right for you. Ask how much to take and when to take it. Take as directed. NSAIDs can cause stomach bleeding and kidney problems if not taken correctly.       Pain relief cream decreases pain. Use this cream as directed.      Take your medicine as directed. Contact your healthcare provider if you think your medicine is not helping or if you have side effects. Tell him of her if you are allergic to any medicine. Keep a list of the medicines, vitamins, and herbs you take. Include the amounts, and when and why you take them. Bring the list or the pill bottles to follow-up visits. Carry your medicine list with you in case of an emergency.    Follow up with your healthcare provider or specialist as directed: Write down your questions so you remember to ask them during your visits.     Self-care:     Apply heat to help decrease pain. Use a heating pad or heat wrap. Apply heat for 20 to 30 minutes every 2 hours for as many days as directed.       Rest as much as possible. Avoid activities that cause joint pain.       Apply ice to help decrease swelling and pain. Ice may also help prevent tissue damage. Use an ice pack, or put crushed ice in a plastic bag. Cover it with a towel and place it on your painful joint for 15 to 20 minutes every hour or as directed.       Support the joint with a brace or elastic wrap as directed.       Elevate your joint above the level of your heart as often as you can to help decrease swelling and pain. Prop your painful joint on pillows or blankets to keep it elevated comfortably.       Lose weight if you are overweight. Extra weight can put pressure on your joints and cause more pain. Ask your healthcare provider how much you should weigh. Ask him to help you create a weight loss plan.       Exercise regularly to help improve joint movement and to decrease pain. Ask about the best exercise plan for you. Low-impact exercises can help take the pressure off your joints. Examples are walking, swimming, and water aerobics.     Physical therapy: A physical therapist teaches you exercises to help improve movement and strength, and to decrease pain. Ask your healthcare provider if physical therapy is right for you.    Contact your healthcare provider or specialist if:     You have a fever.       You continue to have joint pain that cannot be relieved with heat, ice, or medicine.      You have pain and inflammation around your joint.      You have questions or concerns about your condition or care.    Return to the emergency department if:     You have sudden, severe pain when you move your joint.       You have a fever and shaking chills.      You cannot move your joint.      You lose feeling on the side of your body where you have the painful joint.

## 2020-05-24 ENCOUNTER — EMERGENCY (EMERGENCY)
Facility: HOSPITAL | Age: 30
LOS: 1 days | Discharge: HOME | End: 2020-05-24
Attending: EMERGENCY MEDICINE | Admitting: EMERGENCY MEDICINE
Payer: MEDICAID

## 2020-05-24 VITALS
TEMPERATURE: 98 F | WEIGHT: 145.06 LBS | SYSTOLIC BLOOD PRESSURE: 138 MMHG | OXYGEN SATURATION: 99 % | DIASTOLIC BLOOD PRESSURE: 83 MMHG | RESPIRATION RATE: 18 BRPM | HEART RATE: 103 BPM

## 2020-05-24 VITALS — HEART RATE: 101 BPM

## 2020-05-24 PROCEDURE — 93010 ELECTROCARDIOGRAM REPORT: CPT

## 2020-05-24 PROCEDURE — 99284 EMERGENCY DEPT VISIT MOD MDM: CPT

## 2020-05-24 RX ORDER — IBUPROFEN 200 MG
600 TABLET ORAL ONCE
Refills: 0 | Status: COMPLETED | OUTPATIENT
Start: 2020-05-24 | End: 2020-05-24

## 2020-05-24 RX ORDER — ONDANSETRON 8 MG/1
8 TABLET, FILM COATED ORAL ONCE
Refills: 0 | Status: COMPLETED | OUTPATIENT
Start: 2020-05-24 | End: 2020-05-24

## 2020-05-24 RX ADMIN — ONDANSETRON 8 MILLIGRAM(S): 8 TABLET, FILM COATED ORAL at 15:14

## 2020-05-24 RX ADMIN — Medication 600 MILLIGRAM(S): at 15:35

## 2020-05-24 NOTE — ED PROVIDER NOTE - CLINICAL SUMMARY MEDICAL DECISION MAKING FREE TEXT BOX
31 y/o male c/o of migraine associated with nausea. Is awake, alert, and in no distress. Findings are consistent with migraine cephalalgia. Will give Zofran and Ibuprofen. Findings are consistent with migraine cephalalgia. Will give Zofran and Ibuprofen.

## 2020-05-24 NOTE — ED ADULT NURSE NOTE - NSIMPLEMENTINTERV_GEN_ALL_ED
Implemented All Universal Safety Interventions:  [place] to call system. Call bell, personal items and telephone within reach. Instruct patient to call for assistance. Room bathroom lighting operational. Non-slip footwear when patient is off stretcher. Physically safe environment: no spills, clutter or unnecessary equipment. Stretcher in lowest position, wheels locked, appropriate side rails in place.

## 2020-05-24 NOTE — ED PROVIDER NOTE - PATIENT PORTAL LINK FT
You can access the FollowMyHealth Patient Portal offered by [facility] by registering at the following website: [email or web address]. By joining [facility]’s FollowMyHealth portal, you will also be able to view your health information using other applications (apps) compatible with our system.

## 2020-05-24 NOTE — ED ADULT NURSE NOTE - CHIEF COMPLAINT QUOTE
pt eligioa from shelter for c/o nausea endorses recent dx covid at [facility] afebrile used crystal meth hr in triage 103

## 2020-05-24 NOTE — ED PROVIDER NOTE - OBJECTIVE STATEMENT
29 y/o male with Hx of HIV and Kaposi sarcoma, today BIBA from shelter c/o a migraine associated with nausea. EMS states patient smoked crystal meth when they arrived but patient denies this. Patient is requesting medication for headache and nausea and states Ibuprofen usually works. Denies fever, chills, cough, SOB, diarrhea, and abdominal pain. States he tested positive for COVID-19 3 weeks ago.

## 2020-05-24 NOTE — ED PROVIDER NOTE - ENMT, MLM
Airway patent, Nasal mucosa clear. Mouth with normal mucosa. Throat has no vesicles, no oropharyngeal exudates and uvula is midline. No temporal artery induration, erythema or tenderness. Neck is supple, DELIA/EUMI.

## 2020-05-24 NOTE — ED ADULT TRIAGE NOTE - CHIEF COMPLAINT QUOTE
pt eligioa from shelter for c/o nausea endorses recent dx covid at [facility] afebrile used crystal meth hr in triage 103

## 2020-05-28 DIAGNOSIS — U07.1 COVID-19: ICD-10-CM

## 2020-05-28 DIAGNOSIS — G43.909 MIGRAINE, UNSPECIFIED, NOT INTRACTABLE, WITHOUT STATUS MIGRAINOSUS: ICD-10-CM

## 2020-05-28 DIAGNOSIS — Z88.0 ALLERGY STATUS TO PENICILLIN: ICD-10-CM

## 2020-05-28 DIAGNOSIS — R11.0 NAUSEA: ICD-10-CM

## 2021-01-28 NOTE — ED PROCEDURE NOTE - CPROC ED COMPLICATIONS1
Subjective   Patient ID: Bryanna is a 34 year old female.    Chief Complaint   Patient presents with   • Office Visit   • Annual Exam     Patient presents for physical and to establish care.  Had been a patient of Dr. Wood's here.  Still has been seeing OB/GYN regularly.  Has 3 kids.   Pap UTD.  States all have been normal.  Recent diagnosis of breast cancer in a paternal aunt.  She generally eats well.  Flu shot has been given.  Tdap is up-to-date.  Last on record is 2012, but her last pregnancy was 2018.  She will confirm records with OB/GYN.      History reviewed. No pertinent past medical history.  History reviewed. No pertinent surgical history.  Social History     Tobacco Use   • Smoking status: Never Smoker   • Smokeless tobacco: Never Used   Substance Use Topics   • Alcohol use: Yes     Comment: occ   • Drug use: Never     Family History   Problem Relation Age of Onset   • Cancer, Breast Paternal Aunt         about 65     No current outpatient medications on file prior to visit.  No current facility-administered medications on file prior to visit.     ALLERGIES:  No Known Allergies    Review of Systems   Constitutional: Negative for appetite change, fatigue, fever and unexpected weight change.   HENT: Negative for congestion, ear pain, postnasal drip, rhinorrhea, sinus pressure, sneezing and sore throat.    Eyes: Negative.    Respiratory: Negative for cough, shortness of breath and wheezing.    Cardiovascular: Negative for chest pain, palpitations and leg swelling.   Gastrointestinal: Negative for abdominal pain, constipation, diarrhea, nausea and vomiting.   Endocrine: Negative.    Genitourinary: Negative.    Musculoskeletal: Negative for arthralgias, back pain and myalgias.   Skin: Negative for rash.   Neurological: Negative for dizziness, weakness, numbness and headaches.   Psychiatric/Behavioral: Negative.        Objective   Visit Vitals  /80 (BP Location: RUE - Right upper extremity,  Patient Position: Sitting, Cuff Size: Regular)   Temp 97.2 °F (36.2 °C) (Temporal)   Ht 5' 6\" (1.676 m)   Wt 70.8 kg (156 lb)   BMI 25.18 kg/m²     Physical Exam  Vitals signs reviewed.   Constitutional:       General: She is not in acute distress.     Appearance: Normal appearance. She is well-developed.   HENT:      Head: Normocephalic.      Right Ear: Tympanic membrane, ear canal and external ear normal.      Left Ear: Tympanic membrane, ear canal and external ear normal.      Nose: Nose normal.      Mouth/Throat:      Mouth: Mucous membranes are moist.      Pharynx: Oropharynx is clear. No oropharyngeal exudate or posterior oropharyngeal erythema.   Eyes:      General: No scleral icterus.     Extraocular Movements: Extraocular movements intact.      Conjunctiva/sclera: Conjunctivae normal.      Pupils: Pupils are equal, round, and reactive to light.   Neck:      Musculoskeletal: Normal range of motion and neck supple.      Thyroid: No thyromegaly.   Cardiovascular:      Rate and Rhythm: Normal rate and regular rhythm.      Pulses: Normal pulses.      Heart sounds: Normal heart sounds. No murmur.   Pulmonary:      Effort: Pulmonary effort is normal. No respiratory distress.      Breath sounds: Normal breath sounds. No wheezing.   Abdominal:      General: Bowel sounds are normal. There is no distension.      Palpations: Abdomen is soft. There is no mass.      Tenderness: There is no abdominal tenderness. There is no guarding or rebound.   Musculoskeletal:      Right lower leg: No edema.      Left lower leg: No edema.   Lymphadenopathy:      Cervical: No cervical adenopathy.   Skin:     General: Skin is warm and dry.      Findings: No rash.   Neurological:      General: No focal deficit present.      Mental Status: She is alert and oriented to person, place, and time.      Cranial Nerves: No cranial nerve deficit.      Sensory: No sensory deficit.      Motor: No abnormal muscle tone.      Deep Tendon Reflexes:  Reflexes normal.   Psychiatric:         Mood and Affect: Mood normal.         Behavior: Behavior normal.       Immunization History   Administered Date(s) Administered   • Influenza, injectable, quadrivalent, preservative-free 09/18/2020   • Influenza, seasonal, injectable, preservative free 11/01/2012   • Tdap 06/19/2008, 06/09/2009, 06/12/2012       Assessment   Problem List Items Addressed This Visit        Other    Routine adult health maintenance - Primary     Encouraged heathy diet, regular exercise.   Pap UTD by OB/gyne.  Flu shot UTD.  Tdap is UTD.  Last on record is 2012, but her last pregnancy was 2018.  She will confirm records with OB/GYN.  Discussed getting labs with physical next year.    Has seen dermatology for skin cancer screening.   Age appropriate anticipatory guidance provided.                 no

## 2022-01-01 ENCOUNTER — EMERGENCY (EMERGENCY)
Facility: HOSPITAL | Age: 32
LOS: 1 days | Discharge: HOME | End: 2022-01-01
Attending: EMERGENCY MEDICINE | Admitting: EMERGENCY MEDICINE
Payer: MEDICAID

## 2022-01-01 VITALS
DIASTOLIC BLOOD PRESSURE: 81 MMHG | OXYGEN SATURATION: 97 % | HEIGHT: 68 IN | SYSTOLIC BLOOD PRESSURE: 119 MMHG | TEMPERATURE: 98 F | WEIGHT: 149.91 LBS | HEART RATE: 110 BPM | RESPIRATION RATE: 20 BRPM

## 2022-01-01 VITALS
SYSTOLIC BLOOD PRESSURE: 120 MMHG | OXYGEN SATURATION: 99 % | HEART RATE: 102 BPM | DIASTOLIC BLOOD PRESSURE: 80 MMHG | RESPIRATION RATE: 18 BRPM

## 2022-01-01 DIAGNOSIS — G89.29 OTHER CHRONIC PAIN: ICD-10-CM

## 2022-01-01 DIAGNOSIS — M79.671 PAIN IN RIGHT FOOT: ICD-10-CM

## 2022-01-01 DIAGNOSIS — Z88.0 ALLERGY STATUS TO PENICILLIN: ICD-10-CM

## 2022-01-01 DIAGNOSIS — M79.672 PAIN IN LEFT FOOT: ICD-10-CM

## 2022-01-01 DIAGNOSIS — C46.1 KAPOSI'S SARCOMA OF SOFT TISSUE: ICD-10-CM

## 2022-01-01 DIAGNOSIS — Z21 ASYMPTOMATIC HUMAN IMMUNODEFICIENCY VIRUS [HIV] INFECTION STATUS: ICD-10-CM

## 2022-01-01 LAB — GLUCOSE BLDC GLUCOMTR-MCNC: 98 MG/DL — SIGNIFICANT CHANGE UP (ref 70–99)

## 2022-01-01 PROCEDURE — 99283 EMERGENCY DEPT VISIT LOW MDM: CPT

## 2022-01-01 RX ORDER — DIPHENHYDRAMINE HCL 50 MG
0 CAPSULE ORAL
Qty: 0 | Refills: 0 | DISCHARGE

## 2022-01-01 RX ORDER — OXYCODONE AND ACETAMINOPHEN 5; 325 MG/1; MG/1
1 TABLET ORAL
Qty: 20 | Refills: 0
Start: 2022-01-01 | End: 2022-01-05

## 2022-01-01 RX ORDER — OXYCODONE AND ACETAMINOPHEN 5; 325 MG/1; MG/1
2 TABLET ORAL ONCE
Refills: 0 | Status: DISCONTINUED | OUTPATIENT
Start: 2022-01-01 | End: 2022-01-01

## 2022-01-01 RX ORDER — MELOXICAM 15 MG/1
0 TABLET ORAL
Qty: 0 | Refills: 0 | DISCHARGE

## 2022-01-01 RX ADMIN — OXYCODONE AND ACETAMINOPHEN 2 TABLET(S): 5; 325 TABLET ORAL at 22:37

## 2022-01-01 NOTE — ED ADULT NURSE NOTE - OBJECTIVE STATEMENT
c/o b/l feet pain. Bruising covering entire visible body ranging in size. c/o b/l feet pain. Bruising covering entire visible body ranging in size. PMH HIV/AIDS, and Kaposi's sarcoma.

## 2022-01-01 NOTE — ED PROVIDER NOTE - PATIENT PORTAL LINK FT
You can access the FollowMyHealth Patient Portal offered by [facility] by registering at the following website: [email or web address]. By joining [facility]’s FollowMyHealth portal, you will also be able to view your health information using other applications (apps) compatible with our system.

## 2022-01-01 NOTE — ED PROVIDER NOTE - PHYSICAL EXAMINATION
Bilateral lower extremities with chronic swelling, chronic redness (no evidence of cellulitis).  No pitting edema.  Normal pulses, movement, sensation, strength.  Kaposi's sarcoma lesions diffusely on body.  Pressured speech, anxious but awake, alert, oriented x 3, conversant and in no distress.  Ambulates with cane.

## 2022-01-01 NOTE — ED PROVIDER NOTE - NSICDXPASTMEDICALHX_GEN_ALL_CORE_FT
PAST MEDICAL HISTORY:  AIDS with Kaposi's sarcoma     HIV (human immunodeficiency virus infection)     Shingles     Syphilis

## 2022-01-01 NOTE — ED PROVIDER NOTE - PSYCHIATRIC, MLM
Alert and oriented to person, place, time/situation. normal mood and affect. no apparent risk to self or others. As above

## 2022-01-01 NOTE — ED PROVIDER NOTE - OBJECTIVE STATEMENT
30 y/o patient with history of polysubstance abuse, HIV/AIDS, and karposi's sarcoma presents to ED reporting that he is having an exacerbation of bilateral foot pain that is keeping him from being able to comfortably ambulate around his apartment or get to his doctor's appointments.  Patient is under treatment at [place] but is changing to [facility] with his first appointment in 5 days.  Patient states that he frequently has pain in his bilateral feet associated with his karposi and other associated podiatric problems (?gout, others?).  Patient denies any new injury.  Patient states feet looks as they always do (extensive karposi spots as well as edema and growths between toes).  Patient states pain is throughout both feet.  No fever, chills, cough, dyspnea, back pain, abdominal pain, or other complaints.  Patient states he just needs something for the pain and wants to get his sugar checked because he is afraid he might also have diabetes because his father does.  Pressured speech but no SI/HI/hallucinations.

## 2022-01-01 NOTE — ED PROVIDER NOTE - CLINICAL SUMMARY MEDICAL DECISION MAKING FREE TEXT BOX
Patient with end stage kaposi's sarcoma and chronic pain in his bilateral feet.  Patient with no new injury but states exacerbation of his chronic pain.  Feet are swollen bilaterally with extensive chronic changes patient states are not any worse than usual.  Pain is diffuse in his feet.  Patient requests pain medication, has existing appointment with his specialists in 5 days.  Will give small amount of percocet, advise very close followup.  Importance of close followup and precautions for immediate return discussed.

## 2022-01-01 NOTE — ED PROVIDER NOTE - NSICDXNOFAMILYHX_GEN_ALL_ED
Patient would like a refill of tylenol 3 she stated that was the main reason she called in the first place. She wanted to have that to get her through until her appointment with Paul Costello MD on 07/15/2020. She did not want to see an NP sooner. I will send refill request in a new refill encounter.    <-- Click to add NO pertinent Family History

## 2022-01-10 NOTE — ED PROVIDER NOTE - NS ED NOTE AC HIGH RISK COUNTRIES
Occupational Therapy  Visit Type: initial evaluation and discharge  Co-treat with: Physical therapist  Precautions:  Medical precautions: ; standard precautions.   Lines:     Basic: IV and telemetry      Lines in chart and on patient reviewed, precautions maintained throughout session.  Hearing: no hearing deficits  Vision:     Current vision: no visual deficits    SUBJECTIVE  Patient agreed to participate in therapy this date.  Patient verbally agrees to allow the following to be present during session: student  Patient agreeable to session at this time. Patient reporting feeling greatly improved since admission. Patient sitting in chair at end of session, call light within reach.   Patient / Family Goal: return to previous functional status and return home    Pain     At onset of session (out of 10): 0    OBJECTIVE   Level of consciousness: alert    Oriented to person, place, time and situation     Affect/Behavior: alert, cooperative and pleasant  Functional Communication/Cognition    Overall status:  Within functional limits  Range of Motion (measured in degrees unless otherwise indicated)  WFL: LUE RUE  Strength (out of 5 unless otherwise indicated)  WFL: LUE, RUE  Balance (trials in sec unless otherwise indicated)  Sitting:   - Static:  independent double lower extremity support, back support and back unsupported    - Dynamic:  independent double lower extremity support, back support and back unsupported  Standing - Firm Surface - Eyes Open:    - Static: independent   - Dynamic:  independent    Sensation - Upper Extremity  C4 (shoulder, clavicular and upper scapular areas):     Light Touch: Left: intact Right: intact  C5 (deltoid area, anterior aspect of entire arm to base of thumb):     Light Touch: Left: intact Right: intact  C6 (anterior upper extremity, radial side of hand to 1st and 2nd digit):     Light Touch: Left: intact Right: intact  C7 (lateral upper extremity and forearm to 2nd through 4th digit):      Light Touch: Left: intact Right: intact  C8 (medial upper extremity and forearm to 3rd through 5th):     Light Touch: Left: intact Right: intact  T1 (medial side of forearm to base of 5th digit):    Light Touch: Left: intact Right: intact  Coordination  Gross Motor:  LUE: grossly intact  RUE: grossly intact  Fine Motor:  LUE: grossly intact RUE: grossly intact  Bed mobility:      Supine to sit: independent  Training completed:    Tasks: supine to sit    Education details: patient demonstrates understanding  Transfers:    Assistive devices: gait belt and 1 person    Sit to stand: independent    Stand to sit: independent    Training completed:    Tasks: sit to stand and stand to sit    Education details: patient demonstrates understanding  Functional Ambulation:    Assistance:independent   Assistive device:none, 1 person and gait belt    Distance (ft): 300    Surface: even      Education details: patient demonstrates understanding  Activities of Daily Living (ADLs):  Grooming/Oral Hygiene:     Oral hygiene assist: independent    Position: standing at sink    Assist needed for: teeth care  Activities of daily living training:   Patient having pants and socks on prior to arrival.              ASSESSMENT      Patient is a 37 year old female admitted for CVA. Patient lives alone and prior to admit, patient was functioning independently. See prior level of function section below for more details. Patient reports having expressive aphasia and sensation impairments upon admit, however patient currently not experiencing symptoms. Patient able to complete transfers, ambulation and ADL tasks independently. Patient with equal strength and sensation, coordination WNL. Patient with no further OT warranted and will be safe to return home upon discharge.          Discharge Recommendations:  Recommendations for Discharge: OT WI: Home  OT Identified Barriers to Discharge: no OT barriers    Skilled therapy is not required due to pt  functioning independently  Clinical decision making: Low - Patient has few limitations (1-3), comorbidities and/or complexities, as noted in problem focused assessment noted above, that impact their occupational profile.  Resulting in few treatment options and no task modification consistent with low clinical decision making complexity.    End of Session:   Location: in chair  Safety measures: call light within reach and lines intact  Handoff to: nurse  Education Provided:   Learning assessment:  - Primary learner: patient  - Are they ready to learn: yes  - Preferred learning style: verbal  - Barriers to learning: no barriers apparent at this time  Education provided during session:  - Receiving education: patient  - Patient educated on importance of timing with stroke symptoms. 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 I just didn't see what I see on patients\". Therapist reinforcing symptoms and timing  - Results of above outlined education: Verbalizes understanding    PLAN  Suggestions for next session as indicated: OT Frequency: Other (comment) (d/c OT)  Frequency Comments: 1/10: d/c OT, baseline      Interventions: PRIOR LIVING SITUATION:  Information Provided By:: patient (01/10/22 0914)  Type of Home: House (01/10/22 0914)  Home Layout: Two level (01/10/22 0914)  # Steps to Enter: 3 (3 in back 3 in front) (01/10/22 0914)  # Steps in the Home: 10 (01/10/22 0914)  Number of Rails: 2 (1 in the back) (01/10/22 0914)  Lives With: Alone (01/10/22 0914)  Receives Help From: Friend(s) (01/10/22 0914)  Transportation: Drives (01/10/22 0914)  Bathroom Shower/Tub: Tub/Shower unit (01/10/22 0914)  Bathroom Toilet: Standard (01/10/22 0914)  Bathroom Equipment:  (sink by the toilet) (01/10/22 0914)  Laundry on Main Level: No (8 steps to the basement) (01/10/22 0914)    PRIOR LEVEL OF FUNCTION:  Pre-Morbid Modified Randolph Scale: No symptoms at all (01/10/22 0911)  Eating: Independent (01/10/22 0914)  Grooming:  Independent (01/10/22 0914)  Upper Body Dressing: Independent (01/10/22 0914)  Lower Body Dressing: Independent (01/10/22 0914)  Toileting: Independent (01/10/22 0914)  Transfers: Independent (01/10/22 0914)  Ambulation in the Home: Independent (01/10/22 0914)  Ambulation in the Community: Independent (01/10/22 0914)  Steps into the Home: Independent (01/10/22 0914)  Steps within the Home: Independent (01/10/22 0914)  History of Falls in past year: No (01/10/22 0914)  Vision: Does not wear glasses (01/10/22 0914)  Vocational: Full time employment ([employer]) (01/10/22 0914)    Agreement to plan and goals: patient agrees with goals and treatment plan      Documented in the chart in the following areas: Assessment. Plan.      Therapy procedure time and total treatment time can be found documented on the Time Entry flowsheet   No

## 2022-02-20 ENCOUNTER — EMERGENCY (EMERGENCY)
Facility: HOSPITAL | Age: 32
LOS: 1 days | Discharge: HOME | End: 2022-02-20
Attending: EMERGENCY MEDICINE | Admitting: EMERGENCY MEDICINE
Payer: MEDICAID

## 2022-02-20 VITALS
WEIGHT: 175.05 LBS | SYSTOLIC BLOOD PRESSURE: 146 MMHG | DIASTOLIC BLOOD PRESSURE: 79 MMHG | OXYGEN SATURATION: 99 % | HEIGHT: 68 IN | RESPIRATION RATE: 20 BRPM | TEMPERATURE: 99 F | HEART RATE: 117 BPM

## 2022-02-20 PROBLEM — B20 HUMAN IMMUNODEFICIENCY VIRUS [HIV] DISEASE: Chronic | Status: ACTIVE | Noted: 2022-01-01

## 2022-02-20 PROCEDURE — 99282 EMERGENCY DEPT VISIT SF MDM: CPT

## 2022-02-20 NOTE — ED PROVIDER NOTE - NSFOLLOWUPINSTRUCTIONS_ED_ALL_ED_FT
Follow up with your primary care doctor (specialist) or clinics listed below if you do not have a doctor  [address] [address]  To make an appointment, call [phone]  [facility]  Address: [address] [place] [id number]  Appointment Center: [phone] ([phone])   Return immediately for any new or worsening symptoms or any new concerns

## 2022-02-20 NOTE — ED PROVIDER NOTE - OBJECTIVE STATEMENT
31 yom pw bl foot pain.  hx of HIV/AIDS and karposi's sarcoma, hx of chronic bl foot pain.  pt reports having outpatient specialty follow up but does not like them because no one can give him the right diagnosis.  pt states his feet look as they always do, pain diffuse bl foot.  no trauma.  no fc.

## 2022-02-20 NOTE — ED PROVIDER NOTE - CLINICAL SUMMARY MEDICAL DECISION MAKING FREE TEXT BOX
pt w/ chronic bl foot pain, followed outpatient by podiatry and pain management, no evidence of acute limb ischemia, no evidence of cellulitis or gangrene, soft compartment bl, nontoxic appearing, follow up with outpatient care

## 2022-02-20 NOTE — ED PROVIDER NOTE - PATIENT PORTAL LINK FT
You can access the FollowMyHealth Patient Portal offered by [facility] by registering at the following website: [email or web address]. By joining [employer]’s FollowMyHealth portal, you will also be able to view your health information using other applications (apps) compatible with our system.

## 2022-02-20 NOTE — ED PROVIDER NOTE - PHYSICAL EXAMINATION
Physical Exam  GEN: Awake, alert, non-toxic appearing,  EYES: full EOMI,  ENT: External inspection normal, normal voice,   HEAD: atraumatic  RESP: no tachypnea, no hypoxia, no resp distress,  MSK: no joint deformity, soft compartment to bl LE, no calf tenderness/swelling  SKIN: diffuse kaposi' s lesion to bl extremities, no surrounding/streaking erythema, no fluctuance, no vesicles, no gangrene, no pitting edema, pedal pulses equal bl, cap refill < 2 sec, no cyanosis,

## 2022-02-23 DIAGNOSIS — M79.672 PAIN IN LEFT FOOT: ICD-10-CM

## 2022-02-23 DIAGNOSIS — B20 HUMAN IMMUNODEFICIENCY VIRUS [HIV] DISEASE: ICD-10-CM

## 2022-02-23 DIAGNOSIS — M79.671 PAIN IN RIGHT FOOT: ICD-10-CM

## 2022-02-23 DIAGNOSIS — G89.29 OTHER CHRONIC PAIN: ICD-10-CM

## 2022-02-23 DIAGNOSIS — Z88.0 ALLERGY STATUS TO PENICILLIN: ICD-10-CM

## 2022-04-06 NOTE — ED PROCEDURE NOTE - CPROC ED FINDINGS1
CLINICAL PHARMACY NOTE: MEDS TO BEDS    Total # of Prescriptions Filled: 2   The following medications were delivered to the patient:  · Ibuprofen 800 mg Tab  · Docusate 100 mg Cap    Additional Documentation: clear cerebral spinal fluid

## 2022-06-30 NOTE — ED PROVIDER NOTE - COVID-19 ORDERING FACILITY
[facility] Paramedian Forehead Flap Division And Inset Text: Division and inset of the paramedian forehead flap was performed to achieve optimal aesthetic result, restore normal anatomic appearance and avoid distortion of normal anatomy, expedite and facilitate wound healing, achieve optimal functional result and because linear closure either not possible or would produce suboptimal result. The patient was prepped and draped in the usual manner. The pedicle was infiltrated with local anesthesia. The pedicle was sectioned with a #15 blade. The pedicle was de-bulked and trimmed to match the shape of the defect. Hemostasis was achieved. The flap donor site and free margin of the flap were secured with deep buried sutures and the wound edges were re-approximated.

## 2022-07-01 NOTE — ED ADULT NURSE NOTE - MUSCULOSKELETAL ASSESSMENT
Patient presents with lower right back pain and pain with urination x1 month. Reports left testicular swelling since Monday.
WDL

## 2022-10-29 NOTE — ED PROVIDER NOTE - TEMPLATE, MLM
Assumed care of pt @ 1930. Rec'd pt in bed, resting. Neuro checks Q2hrs,  Pt. Opens eyes to voice, slurred speech. Pt. Will withdraw to pain on R. Side, moves left arm freely, weaker on LLE, can lift above bed. Pt. Favors left side. Able to track to right side when prompted. Cangrelor drip per MD orders. Pt. With bursts of AFIB/SVT rate 170's, non sustained. Dr. Erma Saab aware from previous episodes of AFIB on day shift. Speech more clear towards the AM, able to state name, states that he is at home. Pt.  Reoriented to place and time. Straight cath X2 overnoc. 5267:  PT with AFIB RVR, sustained, 's. Dr. Carlita Pimentel MD (DULY) paged. Wale MENCHACA notified. Dr. Claus Drummond consulted. General

## 2024-03-08 NOTE — ED PROVIDER NOTE - CROS ED GI ALL NEG
This writer received Handoff in regards to:  Efrain Mayorga (67 y.o., male)    : 1957    Admitted: 3/7/2024     Report on Efrain Mayorga ,(67 y.o., male), was received from Offgoing nurse, JUSTIN Cole in ED    All questions and concerns of this writer, Patient, and/or Family were addressed at this time. Report on patient's status and plan of care was given.    ____________________________   6 Patient arrived      Patient's Wife and son at bedside. Patient wife to take keys and wallet home.     0430 Consulted Pharmacy. Pharmacy to prepare and bring up medications. Not available in omnicell at this time.    Per Pharmacy- D50% is not in stock on units.Pharmacy does not medication. Hospitalist made aware      0513- Lokelma and calcium ivpb received. See MAR    0630 DC Insulin and D50 per       This writer gave Handoff in regards to:  Efrain Mayorga (67 y.o., male)    : 1957    Admitted: 3/7/2024     This Patient will be received by the oncoming Nurse;     All questions and concerns of the Nurse, Patient, and/or Family were addressed at this time. Report on patient's status and plan of care was given.    - - -

## 2024-03-11 NOTE — REVIEW OF SYSTEMS
[Chest Pain] : chest pain [Joint Pain] : joint pain [Muscle Pain] : muscle pain [Back Pain] : back pain [Suicidal] : not suicidal [Insomnia] : insomnia [Anxiety] : anxiety [Negative] : Heme/Lymph unknown

## 2025-01-07 NOTE — ED ADULT NURSE NOTE - CHIEF COMPLAINT
Brandon Mendez  [facility]  PULED [address]  Scheduled Appointment: 01/30/2025    
The patient is a 28y Male complaining of abdominal pain.

## 2025-03-04 NOTE — ED ADULT NURSE NOTE - PAIN: PRESENCE, MLM
complains of pain/discomfort
[facility] provides services at a reduced cost to those who are determined to be eligible through [facility]’s financial assistance program. Information regarding [facility]’s financial assistance program can be found by going to [email or web address] or by calling [phone].